# Patient Record
Sex: MALE | Race: WHITE
[De-identification: names, ages, dates, MRNs, and addresses within clinical notes are randomized per-mention and may not be internally consistent; named-entity substitution may affect disease eponyms.]

---

## 2020-07-08 ENCOUNTER — HOSPITAL ENCOUNTER (INPATIENT)
Dept: HOSPITAL 61 - ER | Age: 40
LOS: 3 days | Discharge: HOME | DRG: 552 | End: 2020-07-11
Attending: FAMILY MEDICINE | Admitting: FAMILY MEDICINE
Payer: COMMERCIAL

## 2020-07-08 VITALS — DIASTOLIC BLOOD PRESSURE: 72 MMHG | SYSTOLIC BLOOD PRESSURE: 118 MMHG

## 2020-07-08 VITALS — WEIGHT: 242.51 LBS | BODY MASS INDEX: 32.85 KG/M2 | HEIGHT: 72 IN

## 2020-07-08 VITALS — DIASTOLIC BLOOD PRESSURE: 69 MMHG | SYSTOLIC BLOOD PRESSURE: 113 MMHG

## 2020-07-08 VITALS — SYSTOLIC BLOOD PRESSURE: 101 MMHG | DIASTOLIC BLOOD PRESSURE: 55 MMHG

## 2020-07-08 DIAGNOSIS — F17.210: ICD-10-CM

## 2020-07-08 DIAGNOSIS — M54.16: ICD-10-CM

## 2020-07-08 DIAGNOSIS — Z90.89: ICD-10-CM

## 2020-07-08 DIAGNOSIS — Z79.899: ICD-10-CM

## 2020-07-08 DIAGNOSIS — M51.26: ICD-10-CM

## 2020-07-08 DIAGNOSIS — M48.061: Primary | ICD-10-CM

## 2020-07-08 DIAGNOSIS — Z83.49: ICD-10-CM

## 2020-07-08 DIAGNOSIS — G89.29: ICD-10-CM

## 2020-07-08 DIAGNOSIS — E66.01: ICD-10-CM

## 2020-07-08 LAB
% BANDS: 5 % (ref 0–9)
% LYMPHS: 4 % (ref 24–48)
% MONOS: 1 % (ref 0–10)
% SEGS: 90 % (ref 35–66)
ALBUMIN SERPL-MCNC: 4.1 G/DL (ref 3.4–5)
ALBUMIN/GLOB SERPL: 1.2 {RATIO} (ref 1–1.7)
ALP SERPL-CCNC: 82 U/L (ref 46–116)
ALT SERPL-CCNC: 16 U/L (ref 16–63)
ANION GAP SERPL CALC-SCNC: 9 MMOL/L (ref 6–14)
APTT PPP: YELLOW S
AST SERPL-CCNC: 13 U/L (ref 15–37)
BACTERIA #/AREA URNS HPF: 0 /HPF
BASOPHILS # BLD AUTO: 0 X10^3/UL (ref 0–0.2)
BASOPHILS NFR BLD: 0 % (ref 0–3)
BILIRUB SERPL-MCNC: 0.8 MG/DL (ref 0.2–1)
BILIRUB UR QL STRIP: NEGATIVE
BUN SERPL-MCNC: 11 MG/DL (ref 8–26)
BUN/CREAT SERPL: 11 (ref 6–20)
CALCIUM SERPL-MCNC: 8.8 MG/DL (ref 8.5–10.1)
CHLORIDE SERPL-SCNC: 106 MMOL/L (ref 98–107)
CO2 SERPL-SCNC: 25 MMOL/L (ref 21–32)
CREAT SERPL-MCNC: 1 MG/DL (ref 0.7–1.3)
EOSINOPHIL NFR BLD: 0 % (ref 0–3)
EOSINOPHIL NFR BLD: 0 X10^3/UL (ref 0–0.7)
ERYTHROCYTE [DISTWIDTH] IN BLOOD BY AUTOMATED COUNT: 12.6 % (ref 11.5–14.5)
FIBRINOGEN PPP-MCNC: CLEAR MG/DL
GFR SERPLBLD BASED ON 1.73 SQ M-ARVRAT: 83.2 ML/MIN
GLOBULIN SER-MCNC: 3.3 G/DL (ref 2.2–3.8)
GLUCOSE SERPL-MCNC: 148 MG/DL (ref 70–99)
HCT VFR BLD CALC: 47.6 % (ref 39–53)
HGB BLD-MCNC: 16.6 G/DL (ref 13–17.5)
LYMPHOCYTES # BLD: 0.6 X10^3/UL (ref 1–4.8)
LYMPHOCYTES NFR BLD AUTO: 5 % (ref 24–48)
MCH RBC QN AUTO: 31 PG (ref 25–35)
MCHC RBC AUTO-ENTMCNC: 35 G/DL (ref 31–37)
MCV RBC AUTO: 88 FL (ref 79–100)
MONO #: 0.1 X10^3/UL (ref 0–1.1)
MONOCYTES NFR BLD: 1 % (ref 0–9)
NEUT #: 10.7 X10^3/UL (ref 1.8–7.7)
NEUTROPHILS NFR BLD AUTO: 93 % (ref 31–73)
NITRITE UR QL STRIP: NEGATIVE
PH UR STRIP: 7 [PH]
PLATELET # BLD AUTO: 231 X10^3/UL (ref 140–400)
PLATELET # BLD EST: ADEQUATE 10*3/UL
POTASSIUM SERPL-SCNC: 4.4 MMOL/L (ref 3.5–5.1)
PROT SERPL-MCNC: 7.4 G/DL (ref 6.4–8.2)
PROT UR STRIP-MCNC: NEGATIVE MG/DL
RBC # BLD AUTO: 5.41 X10^6/UL (ref 4.3–5.7)
RBC #/AREA URNS HPF: 0 /HPF (ref 0–2)
SODIUM SERPL-SCNC: 140 MMOL/L (ref 136–145)
UROBILINOGEN UR-MCNC: 1 MG/DL
WBC # BLD AUTO: 11.5 X10^3/UL (ref 4–11)
WBC #/AREA URNS HPF: 0 /HPF (ref 0–4)

## 2020-07-08 PROCEDURE — G0378 HOSPITAL OBSERVATION PER HR: HCPCS

## 2020-07-08 PROCEDURE — 96372 THER/PROPH/DIAG INJ SC/IM: CPT

## 2020-07-08 PROCEDURE — 99285 EMERGENCY DEPT VISIT HI MDM: CPT

## 2020-07-08 PROCEDURE — 72131 CT LUMBAR SPINE W/O DYE: CPT

## 2020-07-08 PROCEDURE — 85025 COMPLETE CBC W/AUTO DIFF WBC: CPT

## 2020-07-08 PROCEDURE — 81001 URINALYSIS AUTO W/SCOPE: CPT

## 2020-07-08 PROCEDURE — G0379 DIRECT REFER HOSPITAL OBSERV: HCPCS

## 2020-07-08 PROCEDURE — 85007 BL SMEAR W/DIFF WBC COUNT: CPT

## 2020-07-08 PROCEDURE — 93971 EXTREMITY STUDY: CPT

## 2020-07-08 PROCEDURE — 36415 COLL VENOUS BLD VENIPUNCTURE: CPT

## 2020-07-08 PROCEDURE — 80053 COMPREHEN METABOLIC PANEL: CPT

## 2020-07-08 PROCEDURE — 96374 THER/PROPH/DIAG INJ IV PUSH: CPT

## 2020-07-08 PROCEDURE — 72148 MRI LUMBAR SPINE W/O DYE: CPT

## 2020-07-08 PROCEDURE — 86140 C-REACTIVE PROTEIN: CPT

## 2020-07-08 RX ADMIN — ENOXAPARIN SODIUM SCH MG: 40 INJECTION SUBCUTANEOUS at 19:29

## 2020-07-08 RX ADMIN — OXYCODONE HYDROCHLORIDE AND ACETAMINOPHEN PRN TAB: 5; 325 TABLET ORAL at 19:53

## 2020-07-08 RX ADMIN — HYDROMORPHONE HYDROCHLORIDE PRN MG: 2 INJECTION INTRAMUSCULAR; INTRAVENOUS; SUBCUTANEOUS at 19:52

## 2020-07-08 RX ADMIN — HYDROMORPHONE HYDROCHLORIDE PRN MG: 2 INJECTION INTRAMUSCULAR; INTRAVENOUS; SUBCUTANEOUS at 14:33

## 2020-07-08 RX ADMIN — BACITRACIN SCH MLS/HR: 5000 INJECTION, POWDER, FOR SOLUTION INTRAMUSCULAR at 19:29

## 2020-07-08 RX ADMIN — HYDROMORPHONE HYDROCHLORIDE PRN MG: 2 INJECTION INTRAMUSCULAR; INTRAVENOUS; SUBCUTANEOUS at 16:44

## 2020-07-08 RX ADMIN — HYDROMORPHONE HYDROCHLORIDE PRN MG: 2 INJECTION INTRAMUSCULAR; INTRAVENOUS; SUBCUTANEOUS at 22:34

## 2020-07-08 NOTE — PDOC1
History and Physical


Date of Admission


Date of Admission


DATE: 7/8/20 


TIME: 14:05





Identification/Chief Complaint


Chief Complaint


 seen in er with intractable pain, unresponsive to outpatient treatment  40  

year old male who presents with severe lower back pain that radiates into his 

legs with numbness in his left posterior leg.  He denies any perianal numbness. 

He does not have any bowel or bladder incontinence or retention.  He denies any 

fever.  Pain started after he lifted his GUN  safe with a friend. on Saturday 

progressively got worse and now he is having a difficult time walking   ct c/w 

severe l2l3 disc herniation





Past Medical History


Past Medical History


obesity


Cardiovascular:  No pertinent hx


Pulmonary:  No pertinent hx


GI:  No pertinent hx


Heme/Onc:  No pertinent hx


Musculoskeletal:   low back pain


Endocrine:  No pertinent hx





Family History


Family History


Past Medical History


Past Medical History:  No Pertinent History


Past Surgical History:  Tonsillectomy


Smoking Status:  Current Every Day Smoker


Alcohol Use:  Occasionally


Family History:  High Cholestrol


Family History:  Parent





Social History


Smoke:  <1 pack per day


ALCOHOL:  occassional


Drugs:  None





Current Problem List


Problem List


Problems


Medical Problems:


(1) Back pain


Status: Acute  





(2) Herniated intervertebral disc of lumbar spine


Status: Acute  











Current Medications


Current Medications





Current Medications


Dexamethasone Sodium Phosphate (Decadron) 10 mg 1X  ONCE IVP ;  Start 7/8/20 at 

10:30;  Stop 7/8/20 at 10:31;  Status DC


Oxycodone/ Acetaminophen (Percocet 5/325) 1 tab 1X  ONCE PO  Last administered 

on 7/8/20at 10:39;  Start 7/8/20 at 10:30;  Stop 7/8/20 at 10:31;  Status DC


Methocarbamol (Robaxin) 750 mg 1X  PRN PO MUSCLE SPASMS Last administered on 

7/8/20at 10:40;  Start 7/8/20 at 10:30;  Stop 7/8/20 at 12:00;  Status DC


Dexamethasone Sodium Phosphate (Decadron) 10 mg 1X  ONCE IM  Last administered 

on 7/8/20at 10:40;  Start 7/8/20 at 11:00;  Stop 7/8/20 at 11:01;  Status DC


Morphine Sulfate (Morphine Sulfate) 5 mg 1X  ONCE IV  Last administered on 

7/8/20at 13:07;  Start 7/8/20 at 12:45;  Stop 7/8/20 at 12:46;  Status DC





Allergies


Allergies:  


Coded Allergies:  


     No Known Drug Allergies (Unverified , 7/8/20)





ROS


Review of System


Review of Systems:


General: Denies fever, chills, sweats, fatigue


Eyes: Denies drainage, blurred vision, eye redness


HENT: Denies rhinorrhea, sore throat, earache


Respiratory: Denies cough, shortness of breath, wheezing


Cardiac: Denies edema, palpitations, chest pain


GI: Denies abdominal pain, Nausea, vomiting


MSK: Reports back pain ., numbness left leg to foot


Skin: Denies rash, jaundice


Neuro: Denies headache, dizziness


General:  No: Chills, Night Sweats, Fatigue, Malaise, Appetite, Other


PSYCHOLOGICAL ROS:  No: Anxiety, Behavioral Disorder, Concentration difficultie,

Decreased libido, Depression, Disorientation, Hallucinations, Hostility, 

Irritablity, Memory difficulties, Mood Swings, Obsessive thoughts, Physical 

abuse, Sexual abuse, Sleep disturbances, Suicidal ideation, Other


Eyes:  No Blurry vision, No Decreased vision, No Double vision, No Dry eyes, No 

Excessive tearing, No Eye Pain, No Itchy Eyes, No Loss of vision, No 

Photophobia, No Scotomata, No Uses contacts, No Uses glasses, No Other


HEENT:  No: Heacaches, Visual Changes, Hearing change, Nasal congestion, Nasal 

discharge, Oral lesions, Sinus pain, Sore Throat, Epistaxis, Sneezing, Snoring, 

Tinnitus, Vertigo, Vocal changes, Other


ALLERGY AND IMMUNOLOGY:  No: Hives, Insect Bite Sensitivity, Itchy/Watery Eyes, 

Nasal Congestion, Post Nasal Drip, Seasonal Allergies, Other


Hematological and Lymphatic:  No: Bleeding Problems, Blood Clots, Blood 

Transfusions, Brusing, Night Sweats, Pallor, Swollen Lymph Nodes, Other


ENDOCRINE:  No: Breast Changes, Galactorrhea, Hair Pattern Changes, Hot Flashes,

Malaise/lethargy, Mood Swings, Palpitations, Polydipsia/polyuria, Skin Changes, 

Temperature Intolerance, Unexpected Weight Changes, Other


Respiratory:  No: Cough, Hemoptysis, Orthopnea, Pleuritic Pain, Shortness of 

breath, SOB with excertion, Sputum Changes, Stridor, Tachypnea, Wheezing, Other


Cardiovascular:  No Chest Pain, No Palpitations, No Orthopnea, No Paroxysmal 

Noc. Dyspnea, No Edema, No Lt Headedness, No Other


Gastrointestinal:  No Nausea, No Vomiting, No Abdominal Pain, No Diarrhea, No 

Constipation, No Melena, No Hematochezia, No Other


Genitourinary:  No Dysuria, No Frequency, No Incontinence, No Hematuria, No 

Retention, No Discharge, No Urgency, No Pain, No Flank Pain, No Other, No , No ,

No , No , No , No , No 


Musculoskeletal:  Yes Gait Disturbance, Yes Pain In: (low back, left leg)


Neurological:  Yes Gait Disturbance; 


   No Behavorial Changes, No Bowel/Bladder ControlChng, No Confusion, No 

Dizziness, No Headaches, No Impaired Coord/balance, No Memory Loss, No Nu

mbness/Tingling, No Seizures, No Speech Problems, No Tremors, No Visual Changes,

No Weakness, No Other


Skin:  No Dry Skin, No Eczema, No Hair Changes, No Lumps, No Mole Changes, No 

Mottling, No Nail Changes, No Pruritus, No Rash, No Skin Lesion Changes, No 

Other, No Acne





Physical Exam


Physical Exam


PE:


General: Awake, alert, mild distress. Well Nourished, well hydrated. Cooperative


HEENT: Atraumatic, EOMI, PERRL, airway patent, moist oral mucosa


Neck: Supple, trachea midline


Respiratory: CTA bilaterally, normal effort, no wheezing/crackles


CV: RRR, no murmur, cap refill <2


GI: Soft, nondistended, nontender, no masses


MSK: No obvious deformities, lower back tenderness, decreased range of motion at

the hips due to significant lower back pain, intact distal range of motion and 

strength


Skin: Warm, dry, intact


Neuro: A&O x3, speech NL, sensory and motor grossly intact, no focal deficits


Psych: Normal affect, normal mood, not suicidal or homicidal


General:  Alert, Oriented X3, Cooperative, mild distress


HEENT:  Atraumatic, PERRLA, EOMI, Mucous membr. moist/pink


Lungs:  Clear to auscultation, Normal air movement


Heart:  S1S2, RRR, no thrills, no gallops, no murmurs


Breasts:  Not examined


Abdomen:  Normal bowel sounds, Soft, No tenderness, No masses


Rectal Exam:  not examined


PELVIC:  Examination not indicated


Extremities:  No cyanosis, No edema, Normal pulses


Skin:  No rashes, No breakdown


Neuro:  Normal speech, Cranial nerves 3-12 NL


Psych/Mental Status:  Mental status NL, Mood NL





Vitals


Vitals





Vital Signs








  Date Time  Temp Pulse Resp B/P (MAP) Pulse Ox O2 Delivery O2 Flow Rate FiO2


 


7/8/20 13:07   16  99 Room Air  


 


7/8/20 09:10 98.2 71  141/78 (99)    





 98.2       











Images


Images





Examination: CT LUMBAR SPINE WO CONTRAST


 


History: Reason: severe pain after lifting / Spl. Instructions:  / 


History: 


 


Comparison/Correlation: None


 


Findings: Axial images through the lumbar spine were obtained without 


contrast. Sagittal and coronal reformatted images were provided. Alignment


is normal. No fracture or bone destruction. Sacralization of L5 noted. 


Bone islands involving the iliac bones at the sacroiliac joint region 


noted. Vertebral body heights are adequate.


 


Mild L1-2 concentric disc bulge is present with slight spinal canal 


stenosis.


 


Central disc herniation at L2-3. Effacement of the thecal sac is notable 


with spinal canal stenosis at this level.


 


Mild L3-4 concentric disc bulge noted.


 


Concentric disc bulge at L4-5 also seen. Effacement of thecal sac noted.


 


There is no nerve root effacement. Neural foramina are widely patent.


 


The visualized retroperitoneum is unremarkable.


 


 


Impression:


Central disc herniation at L2-3 with effacement of the thecal sac with 


marked mass effect on the thecal sac and significant spinal canal 


stenosis.


 


Mild concentric disc bulges at other levels also seen with effacement of 


the thecal sac.


 


 


 


PQRS Compliance Statement:


 


One or more of the following individualized dose reduction techniques were


utilized for this examination:  


1. Automated exposure control  


2. Adjustment of the mA and/or kV according to patient size  


3. Use of iterative reconstruction technique


 


Electronically signed by: Qing De La Paz MD (7/8/2020 10:54 AM) [id number]














DICTATED and SIGNED BY:     QING DE LA PAZ MD


DATE:     07/08/20 1054





VTE Prophylaxis Ordered


VTE Prophylaxis Devices:  Yes


VTE Pharmacological Prophylaxi:  Yes





Assessment/Plan


Assessment/Plan


 


Impression:








Intractable low back pain with lumbar radiculopathy


Central disc herniation at L2-3 with effacement of the thecal sac with marked 

mass effect on the thecal sac and significant spinal canal stenosis.


Mild concentric disc bulges at other levels also seen with effacement of  the 

thecal sac.


morbid obesity


gait instability











plan





admit


iv pain control


dvt prophylaxis


MRI L/S


CONSULT Neurosurgery


bedrest 








D/W ER  MRI PLANNED





Justicifation of Admission Dx:


Justifications for Admission:


Justification of Admission Dx:  Yes











JOHNNY BROWN MD           Jul 8, 2020 14:09

## 2020-07-08 NOTE — NUR
Admit prior to shift change. A/O x 4. Wife at bedside. Admit for back pain. Patient reports 
he was moving a gun safe and injured back causing pain in back and left leg. Orientated to 
call light and room. Reviewed POC to include Lumbar Spine CT, Back MRI and pain management. 
Call for assist out of bed. Verbalized understanding. Resting in bed. Call light at hand.

## 2020-07-08 NOTE — PHYS DOC
Past Medical History


Past Medical History:  No Pertinent History


Past Surgical History:  Tonsillectomy


Smoking Status:  Current Every Day Smoker


Alcohol Use:  Occasionally





General Adult


EDM:


Chief Complaint:  LOWER BACK PAIN OR INJURY





HPI:


HPI:





Patient is a 40  year old male who presents with severe lower back pain that 

radiates into his legs with numbness in his left posterior leg.  He denies any 

perianal numbness.  He does not have any bowel or bladder incontinence or 

retention.  He denies any fever.  Pain started after he lifted is safe with a 

friend.  He states that it progressively got worse and now he is having a 

difficult time walking or moving around.





Review of Systems:


Review of Systems:


General: Denies fever, chills, sweats, fatigue


Eyes: Denies drainage, blurred vision, eye redness


HENT: Denies rhinorrhea, sore throat, earache


Respiratory: Denies cough, shortness of breath, wheezing


Cardiac: Denies edema, palpitations, chest pain


GI: Denies abdominal pain, Nausea, vomiting


MSK: Reports back pain


Skin: Denies rash, jaundice


Neuro: Denies headache, dizziness


Psychiatric: Denies SI/HI





Heart Score:


Risk Factors:


Risk Factors:  DM, Current or recent (<one month) smoker, HTN, HLP, family 

history of CAD, obesity.


Risk Scores:


Score 0 - 3:  2.5% MACE over next 6 weeks - Discharge Home


Score 4 - 6:  20.3% MACE over next 6 weeks - Admit for Clinical Observation


Score 7 - 10:  72.7% MACE over next 6 weeks - Early Invasive Strategies





Current Medications:





Current Medications








 Medications


  (Trade)  Dose


 Ordered  [facility]  Start Time


 Stop Time Status Last Admin


Dose Admin


 


 Dexamethasone


 Sodium Phosphate


  (Decadron)  10 mg  1X  ONCE  7/8/20 10:30


 7/8/20 10:31   





 


 Methocarbamol


  (Robaxin)  750 mg  1X  PRN  7/8/20 10:30


   UNV  





 


 Oxycodone/


 Acetaminophen


  (Percocet 5/325)  1 tab  1X  ONCE  7/8/20 10:30


 7/8/20 10:31   














Allergies:


Allergies:





Allergies








Coded Allergies Type Severity Reaction Last Updated Verified


 


  No Known Drug Allergies    7/8/20 No











Physical Exam:


PE:


General: Awake, alert, NAD. Well Nourished, well hydrated. Cooperative


HEENT: Atraumatic, EOMI, PERRL, airway patent, moist oral mucosa


Neck: Supple, trachea midline


Respiratory: CTA bilaterally, normal effort, no wheezing/crackles


CV: RRR, no murmur, cap refill <2


GI: Soft, nondistended, nontender, no masses


MSK: No obvious deformities, lower back tenderness, decreased range of motion at

the hips due to significant lower back pain, intact distal range of motion and 

strength


Skin: Warm, dry, intact


Neuro: A&O x3, speech NL, sensory and motor grossly intact, no focal deficits


Psych: Normal affect, normal mood, not suicidal or homicidal





Current Patient Data:


Vital Signs:





                                   Vital Signs








  Date Time  Temp Pulse Resp B/P (MAP) Pulse Ox O2 Delivery O2 Flow Rate FiO2


 


7/8/20 09:10 98.2 71 20 141/78 (99) 96 Room Air  





 98.2       











EKG:


EKG:


[]





Radiology/Procedures:


Radiology/Procedures:


[]





Course & Med Decision Making:


Course & Med Decision Making


Pertinent Labs and Imaging studies reviewed. (See chart for details)





Patient is a 40-year-old male who presents to the emergency room complaining of 

back pain with leg numbness and pain that radiates into the back of the legs.  

Patient symptoms are consistent with sciatica versus a herniated disc.  He does 

not have any significant symptoms such as perianal numbness, bowel or bladder 

incontinence or retention, fever, strength difficulty.  He will be given 

symptomatic treatment and a CT scan will be done of the back.  CT shows 

herniated disc.  Despite symptomatic care patient is unable to stand or walk.  

He will be admitted for pain control and evaluation by neurosurgery.





Dragon Disclaimer:


Dragon Disclaimer:


This electronic medical record was generated, in whole or in part, using a voice

 recognition dictation system.





Departure


Departure


Impression:  


   Primary Impression:  


   Back pain


   Additional Impression:  


   Herniated intervertebral disc of lumbar spine


Disposition:  09 ADMITTED AS INPATIENT


Condition:  GOOD


Referrals:  


NO PCP (PCP)





Justicifation of Admission Dx:


Justifications for Admission:


Justification of Admission Dx:  Yes











NIDA FAIRCHILD MD               Jul 8, 2020 10:28

## 2020-07-08 NOTE — RAD
Examination: CT LUMBAR SPINE WO CONTRAST

 

History: Reason: severe pain after lifting / Spl. Instructions:  / 

History: 

 

Comparison/Correlation: None

 

Findings: Axial images through the lumbar spine were obtained without 

contrast. Sagittal and coronal reformatted images were provided. Alignment

is normal. No fracture or bone destruction. Sacralization of L5 noted. 

Bone islands involving the iliac bones at the sacroiliac joint region 

noted. Vertebral body heights are adequate.

 

Mild L1-2 concentric disc bulge is present with slight spinal canal 

stenosis.

 

Central disc herniation at L2-3. Effacement of the thecal sac is notable 

with spinal canal stenosis at this level.

 

Mild L3-4 concentric disc bulge noted.

 

Concentric disc bulge at L4-5 also seen. Effacement of thecal sac noted.

 

There is no nerve root effacement. Neural foramina are widely patent.

 

The visualized retroperitoneum is unremarkable.

 

 

Impression:

Central disc herniation at L2-3 with effacement of the thecal sac with 

marked mass effect on the thecal sac and significant spinal canal 

stenosis.

 

Mild concentric disc bulges at other levels also seen with effacement of 

the thecal sac.

 

 

 

PQRS Compliance Statement:

 

One or more of the following individualized dose reduction techniques were

utilized for this examination:  

1. Automated exposure control  

2. Adjustment of the mA and/or kV according to patient size  

3. Use of iterative reconstruction technique

 

Electronically signed by: Logan Buchanan MD (7/8/2020 10:54 AM) [id number]

## 2020-07-08 NOTE — RAD
MRI Lumbar Spine without contrast

 

History: Lumbar radiculopathy

 

Technique: Multiplanar, multi sequential noncontrast MR imaging was 

performed of the lumbar spine.

 

Comparison: CT lumbar spine the same day

 

Findings: 

There is transitional anatomy. Based on assumption of 12 ribs and 5 lumbar

type vertebral bodies, there is partially formed intervertebral disc space

at what is considered S1-S2 with the most inferior fully formed 

intervertebral disc space considered L5-S1 for this report. Conus 

terminates near L1-2. There is no significant focal marrow edema. Lumbar 

vertebral body stature and AP alignment are maintained. There is mild disc

desiccation at what is considered L5-S1. 

 

L1-L2:  This level was not included on the axial images. Neural foramina 

and spinal canal are adequate.

 

L2-L3:  Neural foramina and spinal canal are adequate. There is minimal 

buckling of the ligamentum flavum and facet hypertrophic change.

 

L3-L4:  There is a large extrusion extending above the intervertebral disc

space greatest centrally, measures about 1.3 cm CC by 0.8 cm AP by about 

1.2 cm transverse. There is prominence of posterior epidural fat 

centrally. There is mild buckling of the ligamentum flavum and facet 

hypertrophic change. Combination of findings results in severe spinal 

stenosis with effacement of subarachnoid space. There is degree of lateral

recess stenosis bilaterally. Left neural foramen is adequate. There is 

mild narrowing of the inferior right neural foramen in part by extrusion 

near the undersurface exiting right L3 nerve root.

 

L4-L5:  There is mild buckling of the ligamentum flavum and facet 

degenerative change, minimal fluid in the facet articulations. Neural 

foramina and spinal canal are adequate.

 

L5-S1:  There is very minimal disc osteophyte complex and bulge. There is 

left posterior annular tear. There is minimal facet degenerative change. 

There is mild narrowing of the far left lateral recess. There is mild 

narrowing of the left neural foramen, right neural foramen adequate.

 

Impression: 

 

1.  There is transitional anatomy of the lumbar spine as stated, most 

inferior fully formed intervertebral disc space considered L5-S1 and 

rudimentary intervertebral disc space at what is considered S1-S2. At what

is considered L3-4 for this exam (described as L2-3 level for CT), there 

is extrusion with resultant severe spinal stenosis and effacement 

subarachnoid space. There is mild narrowing of the right L3-4 neural 

foramen in part by extent of extrusion.

2. There is mild left lateral recess stenosis and mild narrowing of the 

left neural foramen at L5-S1.

 

Electronically signed by: Bryon Souza MD (7/8/2020 6:09 PM) [facility]

## 2020-07-09 VITALS — SYSTOLIC BLOOD PRESSURE: 136 MMHG | DIASTOLIC BLOOD PRESSURE: 78 MMHG

## 2020-07-09 VITALS — DIASTOLIC BLOOD PRESSURE: 73 MMHG | SYSTOLIC BLOOD PRESSURE: 108 MMHG

## 2020-07-09 VITALS — SYSTOLIC BLOOD PRESSURE: 122 MMHG | DIASTOLIC BLOOD PRESSURE: 67 MMHG

## 2020-07-09 VITALS — SYSTOLIC BLOOD PRESSURE: 106 MMHG | DIASTOLIC BLOOD PRESSURE: 66 MMHG

## 2020-07-09 VITALS — SYSTOLIC BLOOD PRESSURE: 106 MMHG | DIASTOLIC BLOOD PRESSURE: 60 MMHG

## 2020-07-09 VITALS — SYSTOLIC BLOOD PRESSURE: 126 MMHG | DIASTOLIC BLOOD PRESSURE: 76 MMHG

## 2020-07-09 RX ADMIN — GABAPENTIN SCH MG: 100 CAPSULE ORAL at 21:11

## 2020-07-09 RX ADMIN — HYDROMORPHONE HYDROCHLORIDE PRN MG: 2 INJECTION INTRAMUSCULAR; INTRAVENOUS; SUBCUTANEOUS at 15:37

## 2020-07-09 RX ADMIN — OXYCODONE HYDROCHLORIDE AND ACETAMINOPHEN PRN TAB: 5; 325 TABLET ORAL at 14:08

## 2020-07-09 RX ADMIN — OXYCODONE HYDROCHLORIDE AND ACETAMINOPHEN PRN TAB: 5; 325 TABLET ORAL at 09:24

## 2020-07-09 RX ADMIN — HYDROMORPHONE HYDROCHLORIDE PRN MG: 2 INJECTION INTRAMUSCULAR; INTRAVENOUS; SUBCUTANEOUS at 14:08

## 2020-07-09 RX ADMIN — BACITRACIN SCH MLS/HR: 5000 INJECTION, POWDER, FOR SOLUTION INTRAMUSCULAR at 15:20

## 2020-07-09 RX ADMIN — HYDROMORPHONE HYDROCHLORIDE PRN MG: 2 INJECTION INTRAMUSCULAR; INTRAVENOUS; SUBCUTANEOUS at 21:23

## 2020-07-09 RX ADMIN — BACITRACIN SCH MLS/HR: 5000 INJECTION, POWDER, FOR SOLUTION INTRAMUSCULAR at 21:23

## 2020-07-09 RX ADMIN — DEXAMETHASONE SODIUM PHOSPHATE SCH MG: 4 INJECTION, SOLUTION INTRAMUSCULAR; INTRAVENOUS at 18:13

## 2020-07-09 RX ADMIN — GABAPENTIN SCH MG: 100 CAPSULE ORAL at 14:08

## 2020-07-09 RX ADMIN — HYDROMORPHONE HYDROCHLORIDE PRN MG: 2 INJECTION INTRAMUSCULAR; INTRAVENOUS; SUBCUTANEOUS at 08:18

## 2020-07-09 RX ADMIN — ENOXAPARIN SODIUM SCH MG: 40 INJECTION SUBCUTANEOUS at 15:40

## 2020-07-09 RX ADMIN — HYDROMORPHONE HYDROCHLORIDE PRN MG: 2 INJECTION INTRAMUSCULAR; INTRAVENOUS; SUBCUTANEOUS at 11:51

## 2020-07-09 RX ADMIN — OXYCODONE HYDROCHLORIDE AND ACETAMINOPHEN PRN TAB: 5; 325 TABLET ORAL at 00:36

## 2020-07-09 RX ADMIN — CYCLOBENZAPRINE HYDROCHLORIDE SCH MG: 10 TABLET, FILM COATED ORAL at 18:13

## 2020-07-09 RX ADMIN — OXYCODONE HYDROCHLORIDE AND ACETAMINOPHEN PRN TAB: 5; 325 TABLET ORAL at 05:06

## 2020-07-09 RX ADMIN — BACITRACIN SCH MLS/HR: 5000 INJECTION, POWDER, FOR SOLUTION INTRAMUSCULAR at 05:04

## 2020-07-09 RX ADMIN — DEXAMETHASONE SODIUM PHOSPHATE SCH MG: 4 INJECTION, SOLUTION INTRAMUSCULAR; INTRAVENOUS at 14:09

## 2020-07-09 RX ADMIN — HYDROMORPHONE HYDROCHLORIDE PRN MG: 2 INJECTION INTRAMUSCULAR; INTRAVENOUS; SUBCUTANEOUS at 05:06

## 2020-07-09 RX ADMIN — GABAPENTIN SCH MG: 100 CAPSULE ORAL at 09:24

## 2020-07-09 RX ADMIN — HYDROMORPHONE HYDROCHLORIDE PRN MG: 2 INJECTION INTRAMUSCULAR; INTRAVENOUS; SUBCUTANEOUS at 02:43

## 2020-07-09 RX ADMIN — OXYCODONE HYDROCHLORIDE AND ACETAMINOPHEN PRN TAB: 5; 325 TABLET ORAL at 18:15

## 2020-07-09 NOTE — PDOC
PROGRESS NOTES


Chief Complaint


Chief Complaint


A/P:


Intractable low back pain with left lumbar radiculopathy


Central disc herniation at L2-3 with effacement of the thecal sac with marked 

mass effect on the thecal sac and significant spinal canal stenosis.


Mild concentric disc bulges at other levels also seen with effacement of  the 

thecal sac.


Obesity


gait instability





History of Present Illness


History of Present Illness


Mr Hays is a 39yo M admitted through ED with intractable pain, unresponsive to 

outpatient treatment  40  year old male who presents with severe lower back pain

that radiates into his legs with numbness in his left posterior leg.  He denies 

any perianal numbness.  He does not have any bowel or bladder incontinence or 

retention.  He denies any fever.  Pain started after he lifted his GUN  safe 

with a friend 5 days ago on Saturday.  Initially had improved and on 7/8/2020 in

the morning he was unable to get up out of bed and now he is having a difficult 

time walking and cannot bear weight on his left leg. Ct c/w severe l2l3 disc 

herniation. MRI shows severe spinal stenosis with disc extrusion L3-4.


He has not been out of bed, his pain has been reasonably controlled no numbness 

or tingling in his right or left leg he is able to move both feet.  He is scared

to lift his left leg.  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  No chest 

pain or shortness of breath.





Vitals


Vitals





Vital Signs








  Date Time  Temp Pulse Resp B/P (MAP) Pulse Ox O2 Delivery O2 Flow Rate FiO2


 


7/9/20 08:18     96 Room Air  


 


7/9/20 07:00 98.4 74 18 108/73 (85)    





 98.4       











Physical Exam


General:  Alert, Oriented X3, Cooperative, mild distress


Abdomen:  Normal bowel sounds, Soft, No tenderness, No masses


Extremities:  No cyanosis, No edema, Normal pulses


Skin:  No rashes, No breakdown





Labs


LABS





Laboratory Tests








Test


 7/8/20


15:05 7/8/20


19:00


 


White Blood Count


 11.5 x10^3/uL


(4.0-11.0) 





 


Red Blood Count


 5.41 x10^6/uL


(4.30-5.70) 





 


Hemoglobin


 16.6 g/dL


(13.0-17.5) 





 


Hematocrit


 47.6 %


(39.0-53.0) 





 


Mean Corpuscular Volume 88 fL ()  


 


Mean Corpuscular Hemoglobin 31 pg (25-35)  


 


Mean Corpuscular Hemoglobin


Concent 35 g/dL


(31-37) 





 


Red Cell Distribution Width


 12.6 %


(11.5-14.5) 





 


Platelet Count


 231 x10^3/uL


(140-400) 





 


Neutrophils (%) (Auto) 93 % (31-73)  


 


Lymphocytes (%) (Auto) 5 % (24-48)  


 


Monocytes (%) (Auto) 1 % (0-9)  


 


Eosinophils (%) (Auto) 0 % (0-3)  


 


Basophils (%) (Auto) 0 % (0-3)  


 


Neutrophils # (Auto)


 10.7 x10^3/uL


(1.8-7.7) 





 


Lymphocytes # (Auto)


 0.6 x10^3/uL


(1.0-4.8) 





 


Monocytes # (Auto)


 0.1 x10^3/uL


(0.0-1.1) 





 


Eosinophils # (Auto)


 0.0 x10^3/uL


(0.0-0.7) 





 


Basophils # (Auto)


 0.0 x10^3/uL


(0.0-0.2) 





 


Segmented Neutrophils % 90 % (35-66)  


 


Band Neutrophils % 5 % (0-9)  


 


Lymphocytes % 4 % (24-48)  


 


Monocytes % 1 % (0-10)  


 


Platelet Estimate


 Adequate


(ADEQUATE) 





 


Sodium Level


 140 mmol/L


(136-145) 





 


Potassium Level


 4.4 mmol/L


(3.5-5.1) 





 


Chloride Level


 106 mmol/L


() 





 


Carbon Dioxide Level


 25 mmol/L


(21-32) 





 


Anion Gap 9 (6-14)  


 


Blood Urea Nitrogen


 11 mg/dL


(8-26) 





 


Creatinine


 1.0 mg/dL


(0.7-1.3) 





 


Estimated GFR


(Cockcroft-Gault) 83.2 


 





 


BUN/Creatinine Ratio 11 (6-20)  


 


Glucose Level


 148 mg/dL


(70-99) 





 


Calcium Level


 8.8 mg/dL


(8.5-10.1) 





 


Total Bilirubin


 0.8 mg/dL


(0.2-1.0) 





 


Aspartate Amino Transf


(AST/SGOT) 13 U/L (15-37) 


 





 


Alanine Aminotransferase


(ALT/SGPT) 16 U/L (16-63) 


 





 


Alkaline Phosphatase


 82 U/L


() 





 


C-Reactive Protein,


Quantitative 1.9 mg/L


(0-3.3) 





 


Total Protein


 7.4 g/dL


(6.4-8.2) 





 


Albumin


 4.1 g/dL


(3.4-5.0) 





 


Albumin/Globulin Ratio 1.2 (1.0-1.7)  


 


Urine Collection Type  Unknown 


 


Urine Color  Yellow 


 


Urine Clarity  Clear 


 


Urine pH  7.0 (<5.0-8.0) 


 


Urine Specific Gravity


 


 >=1.030


(1.000-1.030)


 


Urine Protein


 


 Negative mg/dL


(NEG-TRACE)


 


Urine Glucose (UA)


 


 Negative mg/dL


(NEG)


 


Urine Ketones (Stick)  15 mg/dL (NEG) 


 


Urine Blood  Negative (NEG) 


 


Urine Nitrite  Negative (NEG) 


 


Urine Bilirubin  Negative (NEG) 


 


Urine Urobilinogen Dipstick


 


 1.0 mg/dL (0.2


mg/dL)


 


Urine Leukocyte Esterase  Negative (NEG) 


 


Urine RBC  0 /HPF (0-2) 


 


Urine WBC  0 /HPF (0-4) 


 


Urine Bacteria  0 /HPF (0-FEW) 


 


Urine Mucus  Marked /LPF 











Assessment and Plan


Assessmemt and Plan


Problems


Medical Problems:


(1) Back pain


Status: Acute  





(2) Herniated intervertebral disc of lumbar spine


Status: Acute  











Comment


Review of Relevant


I have reviewed the following items stefanie (where applicable) has been applied.


Labs





Laboratory Tests








Test


 7/8/20


15:05 7/8/20


19:00


 


White Blood Count


 11.5 x10^3/uL


(4.0-11.0) 





 


Red Blood Count


 5.41 x10^6/uL


(4.30-5.70) 





 


Hemoglobin


 16.6 g/dL


(13.0-17.5) 





 


Hematocrit


 47.6 %


(39.0-53.0) 





 


Mean Corpuscular Volume 88 fL ()  


 


Mean Corpuscular Hemoglobin 31 pg (25-35)  


 


Mean Corpuscular Hemoglobin


Concent 35 g/dL


(31-37) 





 


Red Cell Distribution Width


 12.6 %


(11.5-14.5) 





 


Platelet Count


 231 x10^3/uL


(140-400) 





 


Neutrophils (%) (Auto) 93 % (31-73)  


 


Lymphocytes (%) (Auto) 5 % (24-48)  


 


Monocytes (%) (Auto) 1 % (0-9)  


 


Eosinophils (%) (Auto) 0 % (0-3)  


 


Basophils (%) (Auto) 0 % (0-3)  


 


Neutrophils # (Auto)


 10.7 x10^3/uL


(1.8-7.7) 





 


Lymphocytes # (Auto)


 0.6 x10^3/uL


(1.0-4.8) 





 


Monocytes # (Auto)


 0.1 x10^3/uL


(0.0-1.1) 





 


Eosinophils # (Auto)


 0.0 x10^3/uL


(0.0-0.7) 





 


Basophils # (Auto)


 0.0 x10^3/uL


(0.0-0.2) 





 


Segmented Neutrophils % 90 % (35-66)  


 


Band Neutrophils % 5 % (0-9)  


 


Lymphocytes % 4 % (24-48)  


 


Monocytes % 1 % (0-10)  


 


Platelet Estimate


 Adequate


(ADEQUATE) 





 


Sodium Level


 140 mmol/L


(136-145) 





 


Potassium Level


 4.4 mmol/L


(3.5-5.1) 





 


Chloride Level


 106 mmol/L


() 





 


Carbon Dioxide Level


 25 mmol/L


(21-32) 





 


Anion Gap 9 (6-14)  


 


Blood Urea Nitrogen


 11 mg/dL


(8-26) 





 


Creatinine


 1.0 mg/dL


(0.7-1.3) 





 


Estimated GFR


(Cockcroft-Gault) 83.2 


 





 


BUN/Creatinine Ratio 11 (6-20)  


 


Glucose Level


 148 mg/dL


(70-99) 





 


Calcium Level


 8.8 mg/dL


(8.5-10.1) 





 


Total Bilirubin


 0.8 mg/dL


(0.2-1.0) 





 


Aspartate Amino Transf


(AST/SGOT) 13 U/L (15-37) 


 





 


Alanine Aminotransferase


(ALT/SGPT) 16 U/L (16-63) 


 





 


Alkaline Phosphatase


 82 U/L


() 





 


C-Reactive Protein,


Quantitative 1.9 mg/L


(0-3.3) 





 


Total Protein


 7.4 g/dL


(6.4-8.2) 





 


Albumin


 4.1 g/dL


(3.4-5.0) 





 


Albumin/Globulin Ratio 1.2 (1.0-1.7)  


 


Urine Collection Type  Unknown 


 


Urine Color  Yellow 


 


Urine Clarity  Clear 


 


Urine pH  7.0 (<5.0-8.0) 


 


Urine Specific Gravity


 


 >=1.030


(1.000-1.030)


 


Urine Protein


 


 Negative mg/dL


(NEG-TRACE)


 


Urine Glucose (UA)


 


 Negative mg/dL


(NEG)


 


Urine Ketones (Stick)  15 mg/dL (NEG) 


 


Urine Blood  Negative (NEG) 


 


Urine Nitrite  Negative (NEG) 


 


Urine Bilirubin  Negative (NEG) 


 


Urine Urobilinogen Dipstick


 


 1.0 mg/dL (0.2


mg/dL)


 


Urine Leukocyte Esterase  Negative (NEG) 


 


Urine RBC  0 /HPF (0-2) 


 


Urine WBC  0 /HPF (0-4) 


 


Urine Bacteria  0 /HPF (0-FEW) 


 


Urine Mucus  Marked /LPF 








Laboratory Tests








Test


 7/8/20


15:05 7/8/20


19:00


 


White Blood Count


 11.5 x10^3/uL


(4.0-11.0) 





 


Red Blood Count


 5.41 x10^6/uL


(4.30-5.70) 





 


Hemoglobin


 16.6 g/dL


(13.0-17.5) 





 


Hematocrit


 47.6 %


(39.0-53.0) 





 


Mean Corpuscular Volume 88 fL ()  


 


Mean Corpuscular Hemoglobin 31 pg (25-35)  


 


Mean Corpuscular Hemoglobin


Concent 35 g/dL


(31-37) 





 


Red Cell Distribution Width


 12.6 %


(11.5-14.5) 





 


Platelet Count


 231 x10^3/uL


(140-400) 





 


Neutrophils (%) (Auto) 93 % (31-73)  


 


Lymphocytes (%) (Auto) 5 % (24-48)  


 


Monocytes (%) (Auto) 1 % (0-9)  


 


Eosinophils (%) (Auto) 0 % (0-3)  


 


Basophils (%) (Auto) 0 % (0-3)  


 


Neutrophils # (Auto)


 10.7 x10^3/uL


(1.8-7.7) 





 


Lymphocytes # (Auto)


 0.6 x10^3/uL


(1.0-4.8) 





 


Monocytes # (Auto)


 0.1 x10^3/uL


(0.0-1.1) 





 


Eosinophils # (Auto)


 0.0 x10^3/uL


(0.0-0.7) 





 


Basophils # (Auto)


 0.0 x10^3/uL


(0.0-0.2) 





 


Segmented Neutrophils % 90 % (35-66)  


 


Band Neutrophils % 5 % (0-9)  


 


Lymphocytes % 4 % (24-48)  


 


Monocytes % 1 % (0-10)  


 


Platelet Estimate


 Adequate


(ADEQUATE) 





 


Sodium Level


 140 mmol/L


(136-145) 





 


Potassium Level


 4.4 mmol/L


(3.5-5.1) 





 


Chloride Level


 106 mmol/L


() 





 


Carbon Dioxide Level


 25 mmol/L


(21-32) 





 


Anion Gap 9 (6-14)  


 


Blood Urea Nitrogen


 11 mg/dL


(8-26) 





 


Creatinine


 1.0 mg/dL


(0.7-1.3) 





 


Estimated GFR


(Cockcroft-Gault) 83.2 


 





 


BUN/Creatinine Ratio 11 (6-20)  


 


Glucose Level


 148 mg/dL


(70-99) 





 


Calcium Level


 8.8 mg/dL


(8.5-10.1) 





 


Total Bilirubin


 0.8 mg/dL


(0.2-1.0) 





 


Aspartate Amino Transf


(AST/SGOT) 13 U/L (15-37) 


 





 


Alanine Aminotransferase


(ALT/SGPT) 16 U/L (16-63) 


 





 


Alkaline Phosphatase


 82 U/L


() 





 


C-Reactive Protein,


Quantitative 1.9 mg/L


(0-3.3) 





 


Total Protein


 7.4 g/dL


(6.4-8.2) 





 


Albumin


 4.1 g/dL


(3.4-5.0) 





 


Albumin/Globulin Ratio 1.2 (1.0-1.7)  


 


Urine Collection Type  Unknown 


 


Urine Color  Yellow 


 


Urine Clarity  Clear 


 


Urine pH  7.0 (<5.0-8.0) 


 


Urine Specific Gravity


 


 >=1.030


(1.000-1.030)


 


Urine Protein


 


 Negative mg/dL


(NEG-TRACE)


 


Urine Glucose (UA)


 


 Negative mg/dL


(NEG)


 


Urine Ketones (Stick)  15 mg/dL (NEG) 


 


Urine Blood  Negative (NEG) 


 


Urine Nitrite  Negative (NEG) 


 


Urine Bilirubin  Negative (NEG) 


 


Urine Urobilinogen Dipstick


 


 1.0 mg/dL (0.2


mg/dL)


 


Urine Leukocyte Esterase  Negative (NEG) 


 


Urine RBC  0 /HPF (0-2) 


 


Urine WBC  0 /HPF (0-4) 


 


Urine Bacteria  0 /HPF (0-FEW) 


 


Urine Mucus  Marked /LPF 








Medications





Current Medications


Dexamethasone Sodium Phosphate (Decadron) 10 mg 1X  ONCE IVP ;  Start 7/8/20 at 

10:30;  Stop 7/8/20 at 10:31;  Status DC


Oxycodone/ Acetaminophen (Percocet 5/325) 1 tab 1X  ONCE PO  Last administered 

on 7/8/20at 10:39;  Start 7/8/20 at 10:30;  Stop 7/8/20 at 10:31;  Status DC


Methocarbamol (Robaxin) 750 mg 1X  PRN PO MUSCLE SPASMS Last administered on 

7/8/20at 10:40;  Start 7/8/20 at 10:30;  Stop 7/8/20 at 12:00;  Status DC


Dexamethasone Sodium Phosphate (Decadron) 10 mg 1X  ONCE IM  Last administered 

on 7/8/20at 10:40;  Start 7/8/20 at 11:00;  Stop 7/8/20 at 11:01;  Status DC


Morphine Sulfate (Morphine Sulfate) 5 mg 1X  ONCE IV  Last administered on 

7/8/20at 13:07;  Start 7/8/20 at 12:45;  Stop 7/8/20 at 12:46;  Status DC


Sodium Chloride (Normal Saline Flush) 3 ml QSHIFT  PRN IV AFTER MEDS AND BLOOD 

DRAWS;  Start 7/8/20 at 14:30


Sodium Chloride 1,000 ml @  100 mls/hr Q10H IV  Last administered on 7/9/20at 

05:04;  Start 7/8/20 at 14:17


Ondansetron HCl (Zofran) 4 mg PRN Q4HRS  PRN IV NAUSEA/VOMITING;  Start 7/8/20 

at 14:30


Zolpidem Tartrate (Ambien) 5 mg PRN QHS  PRN PO INSOMNIA;  Start 7/8/20 at 14:30


Acetaminophen (Tylenol) 650 mg PRN Q4HRS  PRN PO TEMP OVER 100.4F OR MILD PAIN; 

Start 7/8/20 at 14:30


Al Hydroxide/Mg Hydroxide (Mylanta Plus Xs) 30 ml PRN DAILY  PRN PO HEARTBURN / 

GAS;  Start 7/8/20 at 14:30


Clonidine HCl (Catapres) 0.1 mg PRN Q6HRS  PRN PO SBP>160 OR DBP>90;  Start 

7/8/20 at 14:30


Sodium Monofluorophosphate (Fleet Adult) 133 ml PRN DAILY  PRN CA CONSTIPATION; 

Start 7/8/20 at 14:30


Diphenhydramine HCl (Benadryl) 25 mg PRN Q4HRS  PRN IVP ITCHING;  Start 7/8/20 

at 14:30


Docusate Sodium (Colace) 100 mg PRN BID  PRN PO HARD STOOLS;  Start 7/8/20 at 

14:30


Albuterol/ Ipratropium (Duoneb) 3 ml Q4H NEB ;  Start 7/8/20 at 16:00;  Stop 

7/8/20 at 16:01;  Status DC


Guaifenesin (Robitussin) 200 mg PRN Q4HRS  PRN PO COUGH;  Start 7/8/20 at 14:30


Lorazepam (Ativan) 0.5 mg PRN Q4HRS  PRN PO ANXIETY / AGITATION;  Start 7/8/20 

at 14:30


Lorazepam (Ativan Inj) 2 mg PRN Q4HRS  PRN IV ANXIETY / AGITATION;  Start 7/8/20

at 14:30


Hydromorphone HCl (Dilaudid) 0.7 mg PRN Q2HRS  PRN IV SEVERE PAIN 7-10 Last 

administered on 7/9/20at 08:18;  Start 7/8/20 at 14:30


Enoxaparin Sodium (Lovenox 40mg Syringe) 40 mg Q24H SQ  Last administered on 

7/8/20at 19:29;  Start 7/8/20 at 15:00


Oxycodone/ Acetaminophen (Percocet 5/325) 1 tab PRN Q4HRS  PRN PO MODERATE TO 

SEVERE PAIN Last administered on 7/9/20at 05:06;  Start 7/8/20 at 14:30


Albuterol Sulfate (Ventolin Neb Soln) 2.5 mg PRN Q4HRS  PRN NEB SHORTNESS OF 

BREATH;  Start 7/8/20 at 16:15





Active Scripts


Active


Reported


No Known Medications Prior To Admisstion (Info)  Each 1 Each  DAILY


Vitals/I & O





Vital Sign - Last 24 Hours








 7/8/20 7/8/20 7/8/20 7/8/20





 09:10 10:00 10:39 12:40


 


Temp 98.2   





 98.2   


 


Pulse 71 63  64


 


Resp 20  16 


 


B/P (MAP) 141/78 (99) 142/93 (109)  143/81 (101)


 


Pulse Ox 96 95 99 97


 


O2 Delivery Room Air Room Air Room Air Room Air


 


    





    





 7/8/20 7/8/20 7/8/20 7/8/20





 13:07 14:30 14:33 16:00


 


Pulse  73  57


 


Resp 16  24 


 


B/P (MAP)  164/91 (115)  144/83 (103)


 


Pulse Ox 99 100 97 97


 


O2 Delivery Room Air Room Air Room Air Room Air





 7/8/20 7/8/20 7/8/20 7/8/20





 16:44 17:14 17:55 19:00


 


Temp   98.2 97.7





   98.2 97.7


 


Pulse   78 62


 


Resp 16 18 20 19


 


B/P (MAP)   118/72 (87) 113/69 (84)


 


Pulse Ox 99 94 94 95


 


O2 Delivery Room Air Room Air Room Air Room Air


 


    





    





 7/8/20 7/8/20 7/8/20 7/8/20





 19:52 19:53 20:00 20:22


 


Resp 18 18  18


 


Pulse Ox 94 94  94


 


O2 Delivery Room Air Room Air Room Air Room Air





 7/8/20 7/8/20 7/8/20 7/8/20





 20:53 22:34 23:00 23:04


 


Temp   97.6 





   97.6 


 


Pulse   64 


 


Resp 18 18 18 18


 


B/P (MAP)   101/55 (70) 


 


Pulse Ox 94 94 97 94


 


O2 Delivery Room Air Room Air Room Air Room Air


 


    





    





 7/9/20 7/9/20 7/9/20 7/9/20





 00:36 01:36 02:43 02:47


 


Pulse    68


 


Resp 18 18 18 18


 


B/P (MAP)    106/60 (75)


 


Pulse Ox 94 94 94 96


 


O2 Delivery Room Air Room Air Room Air Room Air





 7/9/20 7/9/20 7/9/20 7/9/20





 03:13 05:06 05:06 05:36


 


Resp 18 18 18 18


 


Pulse Ox 96 96 96 96


 


O2 Delivery Room Air Room Air Room Air Room Air





 7/9/20 7/9/20 7/9/20 





 06:06 07:00 08:18 


 


Temp  98.4  





  98.4  


 


Pulse  74  


 


Resp 18 18  


 


B/P (MAP)  108/73 (85)  


 


Pulse Ox 96 96 96 


 


O2 Delivery Room Air Room Air Room Air 














Intake and Output   


 


 7/8/20 7/8/20 7/9/20





 15:00 23:00 07:00


 


Intake Total  650 ml 1300 ml


 


Output Total  300 ml 


 


Balance  350 ml 1300 ml








Images


MRI lumbar spine:


There is transitional anatomy of the lumbar spine as stated, most inferior fully

formed intervertebral disc space considered L5-S1 and rudimentary intervertebral

disc space at what is considered S1-S2. At what is considered L3-4 for this exam

(described as L2-3 level for CT), there is extrusion with resultant severe 

spinal stenosis and effacement subarachnoid space. There is mild narrowing of 

the right L3-4 neural foramen in part by extent of extrusion.


2. There is mild left lateral recess stenosis and mild narrowing of the left 

neural foramen at L5-S1.





Justicifation of Admission Dx:


Justifications for Admission:


Justification of Admission Dx:  Yes











MAURICIO SKINNER MD         Jul 9, 2020 08:52

## 2020-07-09 NOTE — CONS
DATE OF CONSULTATION:  07/09/2020



PULMONARY CONSULTATION



ATTENDING PHYSICIAN:  Howard Heath MD



REASON FOR CONSULTATION:  The patient was seen at the request of Dr. Heath

for rehab evaluation.



LOCATION:  He is in room 207.



HISTORY OF PRESENT ILLNESS:  This is a 40-year-old [place] man.  The patient

started having severe lower back pain with radiation to his left lower extremity

with associated tingling and numbness in his left leg started after helping a

friend lift gun safe.  The patient also admits chronic lower back pain in the

past, but not like at present.  The patient admits some difficulty to strain to

urinate and to have a bowel movement.



PAST MEDICAL HISTORY:  Includes mild obesity.



FAMILY HISTORY:  The patient had family history of hypercholesterolemia with

family.



SOCIAL HISTORY:  He smokes regularly.



ALLERGIES:  The patient is not known allergic to any medication.



He had radiological studies done, which revealed herniated disk at L3-L4 level

or L2-L3 level with extrusion with resulting severe spinal stenosis and

effacement of the subarachnoid space, mild narrowing of right L3-L4 neural

foramina in part by extent of extrusion, mild left lateral recess stenosis and

mild narrowing of the left neural foramina at L5-S1 with associated minimal disk

osteophyte complex and bulge with associated left posterior annular tear at

L5-S1 level.



PHYSICAL EXAMINATION:  On physical examination today revealed a young male

patient, in moderate distress.  He is alert and oriented to time, place, person

and circumstance and follows commands appropriately.  Moves all 4 extremities

voluntarily where he had 5/5 grade muscle strength and deep tendon reflexes are

1 to 2+ and symmetrical with decreased left knee jerk.  He seemed to have equal

perception of touch and pinprick sensation bilaterally.  He had 5/5 grade muscle

strength in his lower extremities.  The patient is having significant pain while

trying to roll from side to side.  Straight leg raising test is negative

bilaterally.  His skin is intact at this time.



ASSESSMENT:

1.  Young male with chronic lower back pain with recent exacerbation after

lifting a gun safe with his friend in the last 3 days.  The patient with

herniated nucleus pulposus at L2-L3 with associated lumbar spinal stenosis and

neural foraminal compromise and lumbar radiculitis.  No clinical evidence of

ongoing lumbar radiculopathy.

2.  Mild obesity.



RECOMMENDATIONS:  Agree with the plans for IV dexamethasone, to start him on

Flexeril for muscle spasms, to try a lumbar support and try to get him up as

tolerated, to consider lumbar epidural steroid injections or surgery if the pain

persists.



Dr. Heath, I appreciate asking me to participate in the care of this

interesting patient.  I will be glad to see him for followup with you on as

needed basis.

 



______________________________

HILDA GRAJEDA MD



DR:  JANET/carrie  JOB#:  [id number] / [id number]

DD:  07/09/2020 14:11  DT:  07/09/2020 14:28

## 2020-07-09 NOTE — NUR
SS following for discharge planning. SS reviewed pt chart and discussed with pt RN. Pt is 
from home with spouse and is currently on room air. PT/OT ordered. SS will continue to 
follow for discharge planning.

## 2020-07-09 NOTE — PDOC
PROGRESS NOTES


Subjective


Subjective


patient seen and examined


consulted for back pain and herniated lumbar disc


c/o back pain and intermittent left hip and leg pain


problems began Saturday after lifting


bilateral straight leg raising, strength and sensation normal


unable to ambulate due to back pain


MRI with large central disc herniation L3-4


discussed options


agree with steroids


ok to feed


will follow


'





Objective


Objective





Vital Signs








  Date Time  Temp Pulse Resp B/P (MAP) Pulse Ox O2 Delivery O2 Flow Rate FiO2


 


7/9/20 14:08     96 Room Air  


 


7/9/20 11:00 98.3 74 18 106/66 (79)    





 98.3       














Intake and Output 


 


 7/9/20





 07:00


 


Intake Total 1950 ml


 


Output Total 300 ml


 


Balance 1650 ml


 


 


 


Intake Oral 950 ml


 


IV Total 1000 ml


 


Output Urine Total 300 ml











Assessment


Assessment


Problems


Medical Problems:


(1) Back pain


Status: Acute  





(2) Herniated intervertebral disc of lumbar spine


Status: Acute  











Comment


Review of Relevant


I have reviewed the following items stefanie (where applicable) has been applied.


Labs





Laboratory Tests








Test


 7/8/20


15:05 7/8/20


19:00


 


White Blood Count


 11.5 x10^3/uL


(4.0-11.0) 





 


Red Blood Count


 5.41 x10^6/uL


(4.30-5.70) 





 


Hemoglobin


 16.6 g/dL


(13.0-17.5) 





 


Hematocrit


 47.6 %


(39.0-53.0) 





 


Mean Corpuscular Volume 88 fL ()  


 


Mean Corpuscular Hemoglobin 31 pg (25-35)  


 


Mean Corpuscular Hemoglobin


Concent 35 g/dL


(31-37) 





 


Red Cell Distribution Width


 12.6 %


(11.5-14.5) 





 


Platelet Count


 231 x10^3/uL


(140-400) 





 


Neutrophils (%) (Auto) 93 % (31-73)  


 


Lymphocytes (%) (Auto) 5 % (24-48)  


 


Monocytes (%) (Auto) 1 % (0-9)  


 


Eosinophils (%) (Auto) 0 % (0-3)  


 


Basophils (%) (Auto) 0 % (0-3)  


 


Neutrophils # (Auto)


 10.7 x10^3/uL


(1.8-7.7) 





 


Lymphocytes # (Auto)


 0.6 x10^3/uL


(1.0-4.8) 





 


Monocytes # (Auto)


 0.1 x10^3/uL


(0.0-1.1) 





 


Eosinophils # (Auto)


 0.0 x10^3/uL


(0.0-0.7) 





 


Basophils # (Auto)


 0.0 x10^3/uL


(0.0-0.2) 





 


Segmented Neutrophils % 90 % (35-66)  


 


Band Neutrophils % 5 % (0-9)  


 


Lymphocytes % 4 % (24-48)  


 


Monocytes % 1 % (0-10)  


 


Platelet Estimate


 Adequate


(ADEQUATE) 





 


Sodium Level


 140 mmol/L


(136-145) 





 


Potassium Level


 4.4 mmol/L


(3.5-5.1) 





 


Chloride Level


 106 mmol/L


() 





 


Carbon Dioxide Level


 25 mmol/L


(21-32) 





 


Anion Gap 9 (6-14)  


 


Blood Urea Nitrogen


 11 mg/dL


(8-26) 





 


Creatinine


 1.0 mg/dL


(0.7-1.3) 





 


Estimated GFR


(Cockcroft-Gault) 83.2 


 





 


BUN/Creatinine Ratio 11 (6-20)  


 


Glucose Level


 148 mg/dL


(70-99) 





 


Calcium Level


 8.8 mg/dL


(8.5-10.1) 





 


Total Bilirubin


 0.8 mg/dL


(0.2-1.0) 





 


Aspartate Amino Transf


(AST/SGOT) 13 U/L (15-37) 


 





 


Alanine Aminotransferase


(ALT/SGPT) 16 U/L (16-63) 


 





 


Alkaline Phosphatase


 82 U/L


() 





 


C-Reactive Protein,


Quantitative 1.9 mg/L


(0-3.3) 





 


Total Protein


 7.4 g/dL


(6.4-8.2) 





 


Albumin


 4.1 g/dL


(3.4-5.0) 





 


Albumin/Globulin Ratio 1.2 (1.0-1.7)  


 


Urine Collection Type  Unknown 


 


Urine Color  Yellow 


 


Urine Clarity  Clear 


 


Urine pH  7.0 (<5.0-8.0) 


 


Urine Specific Gravity


 


 >=1.030


(1.000-1.030)


 


Urine Protein


 


 Negative mg/dL


(NEG-TRACE)


 


Urine Glucose (UA)


 


 Negative mg/dL


(NEG)


 


Urine Ketones (Stick)  15 mg/dL (NEG) 


 


Urine Blood  Negative (NEG) 


 


Urine Nitrite  Negative (NEG) 


 


Urine Bilirubin  Negative (NEG) 


 


Urine Urobilinogen Dipstick


 


 1.0 mg/dL (0.2


mg/dL)


 


Urine Leukocyte Esterase  Negative (NEG) 


 


Urine RBC  0 /HPF (0-2) 


 


Urine WBC  0 /HPF (0-4) 


 


Urine Bacteria  0 /HPF (0-FEW) 


 


Urine Mucus  Marked /LPF 








Laboratory Tests








Test


 7/8/20


15:05 7/8/20


19:00


 


White Blood Count


 11.5 x10^3/uL


(4.0-11.0) 





 


Red Blood Count


 5.41 x10^6/uL


(4.30-5.70) 





 


Hemoglobin


 16.6 g/dL


(13.0-17.5) 





 


Hematocrit


 47.6 %


(39.0-53.0) 





 


Mean Corpuscular Volume 88 fL ()  


 


Mean Corpuscular Hemoglobin 31 pg (25-35)  


 


Mean Corpuscular Hemoglobin


Concent 35 g/dL


(31-37) 





 


Red Cell Distribution Width


 12.6 %


(11.5-14.5) 





 


Platelet Count


 231 x10^3/uL


(140-400) 





 


Neutrophils (%) (Auto) 93 % (31-73)  


 


Lymphocytes (%) (Auto) 5 % (24-48)  


 


Monocytes (%) (Auto) 1 % (0-9)  


 


Eosinophils (%) (Auto) 0 % (0-3)  


 


Basophils (%) (Auto) 0 % (0-3)  


 


Neutrophils # (Auto)


 10.7 x10^3/uL


(1.8-7.7) 





 


Lymphocytes # (Auto)


 0.6 x10^3/uL


(1.0-4.8) 





 


Monocytes # (Auto)


 0.1 x10^3/uL


(0.0-1.1) 





 


Eosinophils # (Auto)


 0.0 x10^3/uL


(0.0-0.7) 





 


Basophils # (Auto)


 0.0 x10^3/uL


(0.0-0.2) 





 


Segmented Neutrophils % 90 % (35-66)  


 


Band Neutrophils % 5 % (0-9)  


 


Lymphocytes % 4 % (24-48)  


 


Monocytes % 1 % (0-10)  


 


Platelet Estimate


 Adequate


(ADEQUATE) 





 


Sodium Level


 140 mmol/L


(136-145) 





 


Potassium Level


 4.4 mmol/L


(3.5-5.1) 





 


Chloride Level


 106 mmol/L


() 





 


Carbon Dioxide Level


 25 mmol/L


(21-32) 





 


Anion Gap 9 (6-14)  


 


Blood Urea Nitrogen


 11 mg/dL


(8-26) 





 


Creatinine


 1.0 mg/dL


(0.7-1.3) 





 


Estimated GFR


(Cockcroft-Gault) 83.2 


 





 


BUN/Creatinine Ratio 11 (6-20)  


 


Glucose Level


 148 mg/dL


(70-99) 





 


Calcium Level


 8.8 mg/dL


(8.5-10.1) 





 


Total Bilirubin


 0.8 mg/dL


(0.2-1.0) 





 


Aspartate Amino Transf


(AST/SGOT) 13 U/L (15-37) 


 





 


Alanine Aminotransferase


(ALT/SGPT) 16 U/L (16-63) 


 





 


Alkaline Phosphatase


 82 U/L


() 





 


C-Reactive Protein,


Quantitative 1.9 mg/L


(0-3.3) 





 


Total Protein


 7.4 g/dL


(6.4-8.2) 





 


Albumin


 4.1 g/dL


(3.4-5.0) 





 


Albumin/Globulin Ratio 1.2 (1.0-1.7)  


 


Urine Collection Type  Unknown 


 


Urine Color  Yellow 


 


Urine Clarity  Clear 


 


Urine pH  7.0 (<5.0-8.0) 


 


Urine Specific Gravity


 


 >=1.030


(1.000-1.030)


 


Urine Protein


 


 Negative mg/dL


(NEG-TRACE)


 


Urine Glucose (UA)


 


 Negative mg/dL


(NEG)


 


Urine Ketones (Stick)  15 mg/dL (NEG) 


 


Urine Blood  Negative (NEG) 


 


Urine Nitrite  Negative (NEG) 


 


Urine Bilirubin  Negative (NEG) 


 


Urine Urobilinogen Dipstick


 


 1.0 mg/dL (0.2


mg/dL)


 


Urine Leukocyte Esterase  Negative (NEG) 


 


Urine RBC  0 /HPF (0-2) 


 


Urine WBC  0 /HPF (0-4) 


 


Urine Bacteria  0 /HPF (0-FEW) 


 


Urine Mucus  Marked /LPF 








Medications





Current Medications


Dexamethasone Sodium Phosphate (Decadron) 10 mg 1X  ONCE IVP ;  Start 7/8/20 at 

10:30;  Stop 7/8/20 at 10:31;  Status DC


Oxycodone/ Acetaminophen (Percocet 5/325) 1 tab 1X  ONCE PO  Last administered 

on 7/8/20at 10:39;  Start 7/8/20 at 10:30;  Stop 7/8/20 at 10:31;  Status DC


Methocarbamol (Robaxin) 750 mg 1X  PRN PO MUSCLE SPASMS Last administered on 

7/8/20at 10:40;  Start 7/8/20 at 10:30;  Stop 7/8/20 at 12:00;  Status DC


Dexamethasone Sodium Phosphate (Decadron) 10 mg 1X  ONCE IM  Last administered 

on 7/8/20at 10:40;  Start 7/8/20 at 11:00;  Stop 7/8/20 at 11:01;  Status DC


Morphine Sulfate (Morphine Sulfate) 5 mg 1X  ONCE IV  Last administered on 

7/8/20at 13:07;  Start 7/8/20 at 12:45;  Stop 7/8/20 at 12:46;  Status DC


Sodium Chloride (Normal Saline Flush) 3 ml QSHIFT  PRN IV AFTER MEDS AND BLOOD 

DRAWS;  Start 7/8/20 at 14:30


Sodium Chloride 1,000 ml @  100 mls/hr Q10H IV  Last administered on 7/9/20at 

05:04;  Start 7/8/20 at 14:17


Ondansetron HCl (Zofran) 4 mg PRN Q4HRS  PRN IV NAUSEA/VOMITING;  Start 7/8/20 

at 14:30


Zolpidem Tartrate (Ambien) 5 mg PRN QHS  PRN PO INSOMNIA;  Start 7/8/20 at 14:30


Acetaminophen (Tylenol) 650 mg PRN Q4HRS  PRN PO TEMP OVER 100.4F OR MILD PAIN; 

Start 7/8/20 at 14:30


Al Hydroxide/Mg Hydroxide (Mylanta Plus Xs) 30 ml PRN DAILY  PRN PO HEARTBURN / 

GAS;  Start 7/8/20 at 14:30


Clonidine HCl (Catapres) 0.1 mg PRN Q6HRS  PRN PO SBP>160 OR DBP>90;  Start 

7/8/20 at 14:30


Sodium Monofluorophosphate (Fleet Adult) 133 ml PRN DAILY  PRN IA CONSTIPATION; 

Start 7/8/20 at 14:30


Diphenhydramine HCl (Benadryl) 25 mg PRN Q4HRS  PRN IVP ITCHING;  Start 7/8/20 

at 14:30


Docusate Sodium (Colace) 100 mg PRN BID  PRN PO HARD STOOLS;  Start 7/8/20 at 

14:30


Albuterol/ Ipratropium (Duoneb) 3 ml Q4H NEB ;  Start 7/8/20 at 16:00;  Stop 

7/8/20 at 16:01;  Status DC


Guaifenesin (Robitussin) 200 mg PRN Q4HRS  PRN PO COUGH;  Start 7/8/20 at 14:30


Lorazepam (Ativan) 0.5 mg PRN Q4HRS  PRN PO ANXIETY / AGITATION;  Start 7/8/20 

at 14:30


Lorazepam (Ativan Inj) 2 mg PRN Q4HRS  PRN IV ANXIETY / AGITATION;  Start 7/8/20

at 14:30


Hydromorphone HCl (Dilaudid) 0.7 mg PRN Q2HRS  PRN IV SEVERE PAIN 7-10 Last 

administered on 7/9/20at 14:08;  Start 7/8/20 at 14:30


Enoxaparin Sodium (Lovenox 40mg Syringe) 40 mg Q24H SQ  Last administered on 

7/8/20at 19:29;  Start 7/8/20 at 15:00


Oxycodone/ Acetaminophen (Percocet 5/325) 1 tab PRN Q4HRS  PRN PO MODERATE TO 

SEVERE PAIN Last administered on 7/9/20at 14:08;  Start 7/8/20 at 14:30


Albuterol Sulfate (Ventolin Neb Soln) 2.5 mg PRN Q4HRS  PRN NEB SHORTNESS OF 

BREATH;  Start 7/8/20 at 16:15


Gabapentin (Neurontin) 100 mg TID PO  Last administered on 7/9/20at 14:08;  

Start 7/9/20 at 09:00


Prednisone (Prednisone) 20 mg DAILY PO  Last administered on 7/9/20at 09:24;  

Start 7/9/20 at 09:00;  Stop 7/9/20 at 12:35;  Status DC


Dexamethasone Sodium Phosphate (Decadron) 4 mg Q6HRS IVP  Last administered on 

7/9/20at 14:09;  Start 7/9/20 at 12:45


Cyclobenzaprine HCl (Flexeril) 10 mg Q6HRS PO ;  Start 7/9/20 at 18:00





Active Scripts


Active


Reported


No Known Medications Prior To Admisstion (Info)  Each 1 Each  DAILY


Vitals/I & O





Vital Sign - Last 24 Hours








 7/8/20 7/8/20 7/8/20 7/8/20





 16:00 16:44 17:14 17:55


 


Temp    98.2





    98.2


 


Pulse 57   78


 


Resp  16 18 20


 


B/P (MAP) 144/83 (103)   118/72 (87)


 


Pulse Ox 97 99 94 94


 


O2 Delivery Room Air Room Air Room Air Room Air


 


    





    





 7/8/20 7/8/20 7/8/20 7/8/20





 19:00 19:52 19:53 20:00


 


Temp 97.7   





 97.7   


 


Pulse 62   


 


Resp 19 18 18 


 


B/P (MAP) 113/69 (84)   


 


Pulse Ox 95 94 94 


 


O2 Delivery Room Air Room Air Room Air Room Air


 


    





    





 7/8/20 7/8/20 7/8/20 7/8/20





 20:22 20:53 22:34 23:00


 


Temp    97.6





    97.6


 


Pulse    64


 


Resp 18 18 18 18


 


B/P (MAP)    101/55 (70)


 


Pulse Ox 94 94 94 97


 


O2 Delivery Room Air Room Air Room Air Room Air


 


    





    





 7/8/20 7/9/20 7/9/20 7/9/20





 23:04 00:36 01:36 02:43


 


Resp 18 18 18 18


 


Pulse Ox 94 94 94 94


 


O2 Delivery Room Air Room Air Room Air Room Air





 7/9/20 7/9/20 7/9/20 7/9/20





 02:47 03:13 05:06 05:06


 


Pulse 68   


 


Resp 18 18 18 18


 


B/P (MAP) 106/60 (75)   


 


Pulse Ox 96 96 96 96


 


O2 Delivery Room Air Room Air Room Air Room Air





 7/9/20 7/9/20 7/9/20 7/9/20





 05:36 06:06 07:00 08:00


 


Temp   98.4 





   98.4 


 


Pulse   74 


 


Resp 18 18 18 


 


B/P (MAP)   108/73 (85) 


 


Pulse Ox 96 96 96 


 


O2 Delivery Room Air Room Air Room Air Room Air


 


    





    





 7/9/20 7/9/20 7/9/20 7/9/20





 08:18 08:48 09:24 10:24


 


Pulse Ox 96 96 96 96


 


O2 Delivery Room Air Room Air Room Air Room Air





 7/9/20 7/9/20 7/9/20 7/9/20





 11:00 11:51 12:21 14:08


 


Temp 98.3   





 98.3   


 


Pulse 74   


 


Resp 18   


 


B/P (MAP) 106/66 (79)   


 


Pulse Ox 93 96 96 96


 


O2 Delivery Room Air Room Air Room Air Room Air


 


    





    





 7/9/20   





 14:08   


 


Pulse Ox 96   


 


O2 Delivery Room Air   














Intake and Output   


 


 7/8/20 7/8/20 7/9/20





 15:00 23:00 07:00


 


Intake Total  650 ml 1300 ml


 


Output Total  300 ml 


 


Balance  350 ml 1300 ml











Justicifation of Admission Dx:


Justifications for Admission:


Justification of Admission Dx:  Yes











DEL CRESPO MD             Jul 9, 2020 14:53

## 2020-07-10 VITALS — DIASTOLIC BLOOD PRESSURE: 68 MMHG | SYSTOLIC BLOOD PRESSURE: 114 MMHG

## 2020-07-10 VITALS — DIASTOLIC BLOOD PRESSURE: 73 MMHG | SYSTOLIC BLOOD PRESSURE: 120 MMHG

## 2020-07-10 VITALS — DIASTOLIC BLOOD PRESSURE: 71 MMHG | SYSTOLIC BLOOD PRESSURE: 120 MMHG

## 2020-07-10 VITALS — SYSTOLIC BLOOD PRESSURE: 110 MMHG | DIASTOLIC BLOOD PRESSURE: 63 MMHG

## 2020-07-10 VITALS — DIASTOLIC BLOOD PRESSURE: 74 MMHG | SYSTOLIC BLOOD PRESSURE: 113 MMHG

## 2020-07-10 VITALS — SYSTOLIC BLOOD PRESSURE: 134 MMHG | DIASTOLIC BLOOD PRESSURE: 87 MMHG

## 2020-07-10 RX ADMIN — ENOXAPARIN SODIUM SCH MG: 40 INJECTION SUBCUTANEOUS at 16:30

## 2020-07-10 RX ADMIN — OXYCODONE HYDROCHLORIDE AND ACETAMINOPHEN PRN TAB: 5; 325 TABLET ORAL at 04:12

## 2020-07-10 RX ADMIN — OXYCODONE HYDROCHLORIDE AND ACETAMINOPHEN PRN TAB: 5; 325 TABLET ORAL at 21:38

## 2020-07-10 RX ADMIN — HYDROMORPHONE HYDROCHLORIDE PRN MG: 2 INJECTION INTRAMUSCULAR; INTRAVENOUS; SUBCUTANEOUS at 10:07

## 2020-07-10 RX ADMIN — OXYCODONE HYDROCHLORIDE AND ACETAMINOPHEN PRN TAB: 5; 325 TABLET ORAL at 09:18

## 2020-07-10 RX ADMIN — HYDROMORPHONE HYDROCHLORIDE PRN MG: 2 INJECTION INTRAMUSCULAR; INTRAVENOUS; SUBCUTANEOUS at 06:20

## 2020-07-10 RX ADMIN — GABAPENTIN SCH MG: 100 CAPSULE ORAL at 16:29

## 2020-07-10 RX ADMIN — GABAPENTIN SCH MG: 100 CAPSULE ORAL at 21:37

## 2020-07-10 RX ADMIN — DEXAMETHASONE SODIUM PHOSPHATE SCH MG: 4 INJECTION, SOLUTION INTRAMUSCULAR; INTRAVENOUS at 00:09

## 2020-07-10 RX ADMIN — DEXAMETHASONE SODIUM PHOSPHATE SCH MG: 4 INJECTION, SOLUTION INTRAMUSCULAR; INTRAVENOUS at 06:19

## 2020-07-10 RX ADMIN — OXYCODONE HYDROCHLORIDE AND ACETAMINOPHEN PRN TAB: 5; 325 TABLET ORAL at 16:29

## 2020-07-10 RX ADMIN — CYCLOBENZAPRINE HYDROCHLORIDE SCH MG: 10 TABLET, FILM COATED ORAL at 06:19

## 2020-07-10 RX ADMIN — DEXAMETHASONE SODIUM PHOSPHATE SCH MG: 4 INJECTION, SOLUTION INTRAMUSCULAR; INTRAVENOUS at 13:13

## 2020-07-10 RX ADMIN — GABAPENTIN SCH MG: 100 CAPSULE ORAL at 08:21

## 2020-07-10 RX ADMIN — CYCLOBENZAPRINE HYDROCHLORIDE SCH MG: 10 TABLET, FILM COATED ORAL at 17:59

## 2020-07-10 RX ADMIN — OXYCODONE HYDROCHLORIDE AND ACETAMINOPHEN PRN TAB: 5; 325 TABLET ORAL at 00:09

## 2020-07-10 RX ADMIN — CYCLOBENZAPRINE HYDROCHLORIDE SCH MG: 10 TABLET, FILM COATED ORAL at 13:12

## 2020-07-10 RX ADMIN — DEXAMETHASONE SODIUM PHOSPHATE SCH MG: 4 INJECTION, SOLUTION INTRAMUSCULAR; INTRAVENOUS at 17:59

## 2020-07-10 RX ADMIN — CYCLOBENZAPRINE HYDROCHLORIDE SCH MG: 10 TABLET, FILM COATED ORAL at 00:09

## 2020-07-10 NOTE — RAD
EXAM: Left lower extremity venous Doppler sonogram.

 

HISTORY: Pain and swelling.

 

TECHNIQUE: Gray scale and color Doppler sonographic evaluation of the left

lower extremity veins with spectral waveform analysis was performed.

 

FINDINGS: There is normal color flow, normal compressibility and there are

normal spectral waveforms in the common femoral, superficial femoral, 

popliteal, posterior tibial and greater saphenous veins.

 

IMPRESSION: No Doppler evidence of lower extremity deep venous thrombosis.

 

Electronically signed by: Kira Mendoza MD (7/10/2020 4:43 PM) [id number]

## 2020-07-10 NOTE — PDOC
PROGRESS NOTES


Subjective


Subjective


He admits less pain this AM and nursing noted him getting to a sitting position 

in bed.





Objective


Objective





Vital Signs








  Date Time  Temp Pulse Resp B/P (MAP) Pulse Ox O2 Delivery O2 Flow Rate FiO2


 


7/10/20 09:18     100 Room Air  


 


7/10/20 07:00 97.9 52 18 120/71 (87)    





 97.9       














Intake and Output 


 


 7/10/20





 07:00


 


Intake Total 1230 ml


 


Output Total 750 ml


 


Balance 480 ml


 


 


 


Intake Oral 230 ml


 


IV Total 1000 ml


 


Output Urine Total 750 ml


 


# Voids 2











Physical Exam


Physical Exam


He is alert,supine in bed and continues with painfully limited lumbar spine ROM.

No change with his neurological examination.





Assessment


Assessment


Problems


Medical Problems:


(1) Back pain


Status: Acute  





(2) Herniated intervertebral disc of lumbar spine


Status: Acute  











Plan


Plan of Care


To get him up as tolerated and hopefully home when can get up and walk and climb

stairs before he can go home to be followed by pain clinic on out patient basis.





Comment


Review of Relevant


I have reviewed the following items stefanie (where applicable) has been applied.


Labs





Laboratory Tests








Test


 7/8/20


15:05 7/8/20


19:00


 


White Blood Count


 11.5 x10^3/uL


(4.0-11.0) 





 


Red Blood Count


 5.41 x10^6/uL


(4.30-5.70) 





 


Hemoglobin


 16.6 g/dL


(13.0-17.5) 





 


Hematocrit


 47.6 %


(39.0-53.0) 





 


Mean Corpuscular Volume 88 fL ()  


 


Mean Corpuscular Hemoglobin 31 pg (25-35)  


 


Mean Corpuscular Hemoglobin


Concent 35 g/dL


(31-37) 





 


Red Cell Distribution Width


 12.6 %


(11.5-14.5) 





 


Platelet Count


 231 x10^3/uL


(140-400) 





 


Neutrophils (%) (Auto) 93 % (31-73)  


 


Lymphocytes (%) (Auto) 5 % (24-48)  


 


Monocytes (%) (Auto) 1 % (0-9)  


 


Eosinophils (%) (Auto) 0 % (0-3)  


 


Basophils (%) (Auto) 0 % (0-3)  


 


Neutrophils # (Auto)


 10.7 x10^3/uL


(1.8-7.7) 





 


Lymphocytes # (Auto)


 0.6 x10^3/uL


(1.0-4.8) 





 


Monocytes # (Auto)


 0.1 x10^3/uL


(0.0-1.1) 





 


Eosinophils # (Auto)


 0.0 x10^3/uL


(0.0-0.7) 





 


Basophils # (Auto)


 0.0 x10^3/uL


(0.0-0.2) 





 


Segmented Neutrophils % 90 % (35-66)  


 


Band Neutrophils % 5 % (0-9)  


 


Lymphocytes % 4 % (24-48)  


 


Monocytes % 1 % (0-10)  


 


Platelet Estimate


 Adequate


(ADEQUATE) 





 


Sodium Level


 140 mmol/L


(136-145) 





 


Potassium Level


 4.4 mmol/L


(3.5-5.1) 





 


Chloride Level


 106 mmol/L


() 





 


Carbon Dioxide Level


 25 mmol/L


(21-32) 





 


Anion Gap 9 (6-14)  


 


Blood Urea Nitrogen


 11 mg/dL


(8-26) 





 


Creatinine


 1.0 mg/dL


(0.7-1.3) 





 


Estimated GFR


(Cockcroft-Gault) 83.2 


 





 


BUN/Creatinine Ratio 11 (6-20)  


 


Glucose Level


 148 mg/dL


(70-99) 





 


Calcium Level


 8.8 mg/dL


(8.5-10.1) 





 


Total Bilirubin


 0.8 mg/dL


(0.2-1.0) 





 


Aspartate Amino Transf


(AST/SGOT) 13 U/L (15-37) 


 





 


Alanine Aminotransferase


(ALT/SGPT) 16 U/L (16-63) 


 





 


Alkaline Phosphatase


 82 U/L


() 





 


C-Reactive Protein,


Quantitative 1.9 mg/L


(0-3.3) 





 


Total Protein


 7.4 g/dL


(6.4-8.2) 





 


Albumin


 4.1 g/dL


(3.4-5.0) 





 


Albumin/Globulin Ratio 1.2 (1.0-1.7)  


 


Urine Collection Type  Unknown 


 


Urine Color  Yellow 


 


Urine Clarity  Clear 


 


Urine pH  7.0 (<5.0-8.0) 


 


Urine Specific Gravity


 


 >=1.030


(1.000-1.030)


 


Urine Protein


 


 Negative mg/dL


(NEG-TRACE)


 


Urine Glucose (UA)


 


 Negative mg/dL


(NEG)


 


Urine Ketones (Stick)  15 mg/dL (NEG) 


 


Urine Blood  Negative (NEG) 


 


Urine Nitrite  Negative (NEG) 


 


Urine Bilirubin  Negative (NEG) 


 


Urine Urobilinogen Dipstick


 


 1.0 mg/dL (0.2


mg/dL)


 


Urine Leukocyte Esterase  Negative (NEG) 


 


Urine RBC  0 /HPF (0-2) 


 


Urine WBC  0 /HPF (0-4) 


 


Urine Bacteria  0 /HPF (0-FEW) 


 


Urine Mucus  Marked /LPF 








Medications





Current Medications


Dexamethasone Sodium Phosphate (Decadron) 10 mg 1X  ONCE IVP ;  Start 7/8/20 at 

10:30;  Stop 7/8/20 at 10:31;  Status DC


Oxycodone/ Acetaminophen (Percocet 5/325) 1 tab 1X  ONCE PO  Last administered 

on 7/8/20at 10:39;  Start 7/8/20 at 10:30;  Stop 7/8/20 at 10:31;  Status DC


Methocarbamol (Robaxin) 750 mg 1X  PRN PO MUSCLE SPASMS Last administered on 

7/8/20at 10:40;  Start 7/8/20 at 10:30;  Stop 7/8/20 at 12:00;  Status DC


Dexamethasone Sodium Phosphate (Decadron) 10 mg 1X  ONCE IM  Last administered 

on 7/8/20at 10:40;  Start 7/8/20 at 11:00;  Stop 7/8/20 at 11:01;  Status DC


Morphine Sulfate (Morphine Sulfate) 5 mg 1X  ONCE IV  Last administered on 

7/8/20at 13:07;  Start 7/8/20 at 12:45;  Stop 7/8/20 at 12:46;  Status DC


Sodium Chloride (Normal Saline Flush) 3 ml QSHIFT  PRN IV AFTER MEDS AND BLOOD 

DRAWS;  Start 7/8/20 at 14:30


Sodium Chloride 1,000 ml @  100 mls/hr Q10H IV  Last administered on 7/9/20at 

21:23;  Start 7/8/20 at 14:17


Ondansetron HCl (Zofran) 4 mg PRN Q4HRS  PRN IV NAUSEA/VOMITING;  Start 7/8/20 

at 14:30


Zolpidem Tartrate (Ambien) 5 mg PRN QHS  PRN PO INSOMNIA;  Start 7/8/20 at 14:30


Acetaminophen (Tylenol) 650 mg PRN Q4HRS  PRN PO TEMP OVER 100.4F OR MILD PAIN; 

Start 7/8/20 at 14:30


Al Hydroxide/Mg Hydroxide (Mylanta Plus Xs) 30 ml PRN DAILY  PRN PO HEARTBURN / 

GAS;  Start 7/8/20 at 14:30


Clonidine HCl (Catapres) 0.1 mg PRN Q6HRS  PRN PO SBP>160 OR DBP>90;  Start 

7/8/20 at 14:30


Sodium Monofluorophosphate (Fleet Adult) 133 ml PRN DAILY  PRN ID CONSTIPATION; 

Start 7/8/20 at 14:30


Diphenhydramine HCl (Benadryl) 25 mg PRN Q4HRS  PRN IVP ITCHING;  Start 7/8/20 

at 14:30


Docusate Sodium (Colace) 100 mg PRN BID  PRN PO HARD STOOLS;  Start 7/8/20 at 

14:30


Albuterol/ Ipratropium (Duoneb) 3 ml Q4H NEB ;  Start 7/8/20 at 16:00;  Stop 

7/8/20 at 16:01;  Status DC


Guaifenesin (Robitussin) 200 mg PRN Q4HRS  PRN PO COUGH;  Start 7/8/20 at 14:30


Lorazepam (Ativan) 0.5 mg PRN Q4HRS  PRN PO ANXIETY / AGITATION Last 

administered on 7/10/20at 00:09;  Start 7/8/20 at 14:30


Lorazepam (Ativan Inj) 2 mg PRN Q4HRS  PRN IV ANXIETY / AGITATION;  Start 7/8/20

at 14:30


Hydromorphone HCl (Dilaudid) 0.7 mg PRN Q2HRS  PRN IV SEVERE PAIN 7-10 Last 

administered on 7/10/20at 06:20;  Start 7/8/20 at 14:30


Enoxaparin Sodium (Lovenox 40mg Syringe) 40 mg Q24H SQ  Last administered on 

7/9/20at 15:40;  Start 7/8/20 at 15:00


Oxycodone/ Acetaminophen (Percocet 5/325) 1 tab PRN Q4HRS  PRN PO MODERATE TO 

SEVERE PAIN Last administered on 7/10/20at 09:18;  Start 7/8/20 at 14:30


Albuterol Sulfate (Ventolin Neb Soln) 2.5 mg PRN Q4HRS  PRN NEB SHORTNESS OF 

BREATH;  Start 7/8/20 at 16:15


Gabapentin (Neurontin) 100 mg TID PO  Last administered on 7/10/20at 08:21;  

Start 7/9/20 at 09:00


Prednisone (Prednisone) 20 mg DAILY PO  Last administered on 7/9/20at 09:24;  

Start 7/9/20 at 09:00;  Stop 7/9/20 at 12:35;  Status DC


Dexamethasone Sodium Phosphate (Decadron) 4 mg Q6HRS IVP  Last administered on 

7/10/20at 06:19;  Start 7/9/20 at 12:45


Cyclobenzaprine HCl (Flexeril) 10 mg Q6HRS PO  Last administered on 7/10/20at 

06:19;  Start 7/9/20 at 18:00





Active Scripts


Active


Reported


No Known Medications Prior To Admisstion (Info)  Each 1 Each  DAILY


Vitals/I & O





Vital Sign - Last 24 Hours








 7/9/20 7/9/20 7/9/20 7/9/20





 10:24 11:00 11:51 12:21


 


Temp  98.3  





  98.3  


 


Pulse  74  


 


Resp  18  


 


B/P (MAP)  106/66 (79)  


 


Pulse Ox 96 93 96 96


 


O2 Delivery Room Air Room Air Room Air Room Air


 


    





    





 7/9/20 7/9/20 7/9/20 7/9/20





 14:08 14:08 14:38 15:00


 


Temp    98.3





    98.3


 


Pulse    64


 


Resp    18


 


B/P (MAP)    126/76 (93)


 


Pulse Ox 96 96 93 93


 


O2 Delivery Room Air Room Air Room Air Room Air


 


    





    





 7/9/20 7/9/20 7/9/20 7/9/20





 15:08 15:37 16:07 18:15


 


Pulse Ox 93  93 93


 


O2 Delivery Room Air Room Air Room Air Room Air





 7/9/20 7/9/20 7/9/20 7/9/20





 19:15 19:52 20:00 21:23


 


Temp  98.2  





  98.2  


 


Pulse  60  


 


Resp 18 18  18


 


B/P (MAP)  136/78 (97)  


 


Pulse Ox 93 99  99


 


O2 Delivery Room Air Room Air Room Air Room Air


 


    





    





 7/9/20 7/9/20 7/10/20 7/10/20





 21:53 22:39 00:09 01:09


 


Temp  97.9  





  97.9  


 


Pulse  56  


 


Resp 18 18 18 18


 


B/P (MAP)  122/67 (85)  


 


Pulse Ox 99 100 100 100


 


O2 Delivery Room Air Room Air Room Air Room Air


 


    





    





 7/10/20 7/10/20 7/10/20 7/10/20





 03:30 04:12 05:12 06:20


 


Temp 97.9   





 97.9   


 


Pulse 57   


 


Resp 16 18 18 18


 


B/P (MAP) 113/74 (87)   


 


Pulse Ox 98 100 98 98


 


O2 Delivery Room Air Room Air Room Air Room Air


 


    





    





 7/10/20 7/10/20 7/10/20 





 06:50 07:00 09:18 


 


Temp  97.9  





  97.9  


 


Pulse  52  


 


Resp  18  


 


B/P (MAP)  120/71 (87)  


 


Pulse Ox 98 100 100 


 


O2 Delivery Room Air Room Air Room Air 














Intake and Output   


 


 7/9/20 7/9/20 7/10/20





 15:00 23:00 07:00


 


Intake Total 0 ml 1200 ml 30 ml


 


Output Total  350 ml 400 ml


 


Balance 0 ml 850 ml -370 ml











Justicifation of Admission Dx:


Justifications for Admission:


Justification of Admission Dx:  Yes











HILDA GRAJEDA MD            Jul 10, 2020 09:35

## 2020-07-10 NOTE — NUR
SS following up with discharge planning. SS reviewed pt chart and discussed with pt RN. Pt 
is currently on room air. PT recommended home with outpatient. SS will continue to follow 
for discharge planning.
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
## 2020-07-10 NOTE — PDOC
PROGRESS NOTES


Subjective


Subjective


feels better today 


was able to ambulate with walker but contiues with back pain and left leg 

intermittent numbness





Objective


Objective





Vital Signs








  Date Time  Temp Pulse Resp B/P (MAP) Pulse Ox O2 Delivery O2 Flow Rate FiO2


 


7/10/20 11:00 98.1 59 18 114/68 (83) 100 Room Air  





 98.1       














Intake and Output 


 


 7/10/20





 07:00


 


Intake Total 1230 ml


 


Output Total 750 ml


 


Balance 480 ml


 


 


 


Intake Oral 230 ml


 


IV Total 1000 ml


 


Output Urine Total 750 ml


 


# Voids 2











Physical Exam


General:  Alert, Oriented X3, Cooperative


MUSCULOSKELETAL:  Other (MCKEON)


Neuro:  Other (strength 5/5 in LE)





Assessment


Assessment


Problems


Medical Problems:


(1) Back pain


Status: Acute  





(2) Herniated intervertebral disc of lumbar spine


Status: Acute  











Plan


Plan of Care


Continue steroids


will follow


encouraged activity as tolerated


PT





Comment


Review of Relevant


I have reviewed the following items stefanie (where applicable) has been applied.


Labs





Laboratory Tests








Test


 7/8/20


19:00


 


Urine Collection Type Unknown 


 


Urine Color Yellow 


 


Urine Clarity Clear 


 


Urine pH 7.0 (<5.0-8.0) 


 


Urine Specific Gravity


 >=1.030


(1.000-1.030)


 


Urine Protein


 Negative mg/dL


(NEG-TRACE)


 


Urine Glucose (UA)


 Negative mg/dL


(NEG)


 


Urine Ketones (Stick) 15 mg/dL (NEG) 


 


Urine Blood Negative (NEG) 


 


Urine Nitrite Negative (NEG) 


 


Urine Bilirubin Negative (NEG) 


 


Urine Urobilinogen Dipstick


 1.0 mg/dL (0.2


mg/dL)


 


Urine Leukocyte Esterase Negative (NEG) 


 


Urine RBC 0 /HPF (0-2) 


 


Urine WBC 0 /HPF (0-4) 


 


Urine Bacteria 0 /HPF (0-FEW) 


 


Urine Mucus Marked /LPF 








Medications





Current Medications


Dexamethasone Sodium Phosphate (Decadron) 10 mg 1X  ONCE IVP ;  Start 7/8/20 at 

10:30;  Stop 7/8/20 at 10:31;  Status DC


Oxycodone/ Acetaminophen (Percocet 5/325) 1 tab 1X  ONCE PO  Last administered 

on 7/8/20at 10:39;  Start 7/8/20 at 10:30;  Stop 7/8/20 at 10:31;  Status DC


Methocarbamol (Robaxin) 750 mg 1X  PRN PO MUSCLE SPASMS Last administered on 

7/8/20at 10:40;  Start 7/8/20 at 10:30;  Stop 7/8/20 at 12:00;  Status DC


Dexamethasone Sodium Phosphate (Decadron) 10 mg 1X  ONCE IM  Last administered 

on 7/8/20at 10:40;  Start 7/8/20 at 11:00;  Stop 7/8/20 at 11:01;  Status DC


Morphine Sulfate (Morphine Sulfate) 5 mg 1X  ONCE IV  Last administered on 

7/8/20at 13:07;  Start 7/8/20 at 12:45;  Stop 7/8/20 at 12:46;  Status DC


Sodium Chloride (Normal Saline Flush) 3 ml QSHIFT  PRN IV AFTER MEDS AND BLOOD 

DRAWS;  Start 7/8/20 at 14:30


Sodium Chloride 1,000 ml @  100 mls/hr Q10H IV  Last administered on 7/9/20at 

21:23;  Start 7/8/20 at 14:17;  Stop 7/10/20 at 11:39;  Status DC


Ondansetron HCl (Zofran) 4 mg PRN Q4HRS  PRN IV NAUSEA/VOMITING;  Start 7/8/20 

at 14:30


Zolpidem Tartrate (Ambien) 5 mg PRN QHS  PRN PO INSOMNIA;  Start 7/8/20 at 14:30


Acetaminophen (Tylenol) 650 mg PRN Q4HRS  PRN PO TEMP OVER 100.4F OR MILD PAIN; 

Start 7/8/20 at 14:30


Al Hydroxide/Mg Hydroxide (Mylanta Plus Xs) 30 ml PRN DAILY  PRN PO HEARTBURN / 

GAS;  Start 7/8/20 at 14:30


Clonidine HCl (Catapres) 0.1 mg PRN Q6HRS  PRN PO SBP>160 OR DBP>90;  Start 

7/8/20 at 14:30


Sodium Monofluorophosphate (Fleet Adult) 133 ml PRN DAILY  PRN [place] CONSTIPATION; 

Start 7/8/20 at 14:30


Diphenhydramine HCl (Benadryl) 25 mg PRN Q4HRS  PRN IVP ITCHING;  Start 7/8/20 

at 14:30


Docusate Sodium (Colace) 100 mg PRN BID  PRN PO HARD STOOLS;  Start 7/8/20 at 1

4:30


Albuterol/ Ipratropium (Duoneb) 3 ml Q4H NEB ;  Start 7/8/20 at 16:00;  Stop 

7/8/20 at 16:01;  Status DC


Guaifenesin (Robitussin) 200 mg PRN Q4HRS  PRN PO COUGH;  Start 7/8/20 at 14:30


Lorazepam (Ativan) 0.5 mg PRN Q4HRS  PRN PO ANXIETY / AGITATION Last 

administered on 7/10/20at 00:09;  Start 7/8/20 at 14:30


Lorazepam (Ativan Inj) 2 mg PRN Q4HRS  PRN IV ANXIETY / AGITATION;  Start 7/8/20

at 14:30


Hydromorphone HCl (Dilaudid) 0.7 mg PRN Q2HRS  PRN IV SEVERE PAIN 7-10 Last 

administered on 7/10/20at 10:07;  Start 7/8/20 at 14:30


Enoxaparin Sodium (Lovenox 40mg Syringe) 40 mg Q24H SQ  Last administered on 

7/9/20at 15:40;  Start 7/8/20 at 15:00


Oxycodone/ Acetaminophen (Percocet 5/325) 1 tab PRN Q4HRS  PRN PO MODERATE TO 

SEVERE PAIN Last administered on 7/10/20at 09:18;  Start 7/8/20 at 14:30


Albuterol Sulfate (Ventolin Neb Soln) 2.5 mg PRN Q4HRS  PRN NEB SHORTNESS OF 

BREATH;  Start 7/8/20 at 16:15


Gabapentin (Neurontin) 100 mg TID PO  Last administered on 7/10/20at 08:21;  

Start 7/9/20 at 09:00


Prednisone (Prednisone) 20 mg DAILY PO  Last administered on 7/9/20at 09:24;  

Start 7/9/20 at 09:00;  Stop 7/9/20 at 12:35;  Status DC


Dexamethasone Sodium Phosphate (Decadron) 4 mg Q6HRS IVP  Last administered on 

7/10/20at 13:13;  Start 7/9/20 at 12:45


Cyclobenzaprine HCl (Flexeril) 10 mg Q6HRS PO  Last administered on 7/10/20at 

13:12;  Start 7/9/20 at 18:00





Active Scripts


Active


Reported


No Known Medications Prior To Admisstion (Info)  Each 1 Each MC DAILY


Vitals/I & O





Vital Sign - Last 24 Hours








 7/9/20 7/9/20 7/9/20 7/9/20





 18:15 19:15 19:52 20:00


 


Temp   98.2 





   98.2 


 


Pulse   60 


 


Resp  18 18 


 


B/P (MAP)   136/78 (97) 


 


Pulse Ox 93 93 99 


 


O2 Delivery Room Air Room Air Room Air Room Air


 


    





    





 7/9/20 7/9/20 7/9/20 7/10/20





 21:23 21:53 22:39 00:09


 


Temp   97.9 





   97.9 


 


Pulse   56 


 


Resp 18 18 18 18


 


B/P (MAP)   122/67 (85) 


 


Pulse Ox 99 99 100 100


 


O2 Delivery Room Air Room Air Room Air Room Air


 


    





    





 7/10/20 7/10/20 7/10/20 7/10/20





 01:09 03:30 04:12 05:12


 


Temp  97.9  





  97.9  


 


Pulse  57  


 


Resp 18 16 18 18


 


B/P (MAP)  113/74 (87)  


 


Pulse Ox 100 98 100 98


 


O2 Delivery Room Air Room Air Room Air Room Air


 


    





    





 7/10/20 7/10/20 7/10/20 7/10/20





 06:20 06:50 07:00 08:00


 


Temp   97.9 





   97.9 


 


Pulse   52 


 


Resp 18  18 


 


B/P (MAP)   120/71 (87) 


 


Pulse Ox 98 98 100 


 


O2 Delivery Room Air Room Air Room Air Room Air


 


    





    





 7/10/20 7/10/20 7/10/20 7/10/20





 09:18 10:07 10:18 10:37


 


Pulse Ox 100 100 100 100


 


O2 Delivery Room Air Room Air Room Air Room Air





 7/10/20   





 11:00   


 


Temp 98.1   





 98.1   


 


Pulse 59   


 


Resp 18   


 


B/P (MAP) 114/68 (83)   


 


Pulse Ox 100   


 


O2 Delivery Room Air   














Intake and Output   


 


 7/9/20 7/9/20 7/10/20





 15:00 23:00 07:00


 


Intake Total 0 ml 1200 ml 30 ml


 


Output Total  350 ml 400 ml


 


Balance 0 ml 850 ml -370 ml











Justicifation of Admission Dx:


Justifications for Admission:


Justification of Admission Dx:  Yes











SEBAS DODGE APRN          Jul 10, 2020 16:33

## 2020-07-11 VITALS — DIASTOLIC BLOOD PRESSURE: 73 MMHG | SYSTOLIC BLOOD PRESSURE: 121 MMHG

## 2020-07-11 VITALS — DIASTOLIC BLOOD PRESSURE: 82 MMHG | SYSTOLIC BLOOD PRESSURE: 129 MMHG

## 2020-07-11 VITALS — DIASTOLIC BLOOD PRESSURE: 84 MMHG | SYSTOLIC BLOOD PRESSURE: 126 MMHG

## 2020-07-11 VITALS — SYSTOLIC BLOOD PRESSURE: 127 MMHG | DIASTOLIC BLOOD PRESSURE: 79 MMHG

## 2020-07-11 RX ADMIN — OXYCODONE HYDROCHLORIDE AND ACETAMINOPHEN PRN TAB: 5; 325 TABLET ORAL at 02:15

## 2020-07-11 RX ADMIN — CYCLOBENZAPRINE HYDROCHLORIDE SCH MG: 10 TABLET, FILM COATED ORAL at 11:35

## 2020-07-11 RX ADMIN — DEXAMETHASONE SODIUM PHOSPHATE SCH MG: 4 INJECTION, SOLUTION INTRAMUSCULAR; INTRAVENOUS at 00:27

## 2020-07-11 RX ADMIN — OXYCODONE HYDROCHLORIDE AND ACETAMINOPHEN PRN TAB: 5; 325 TABLET ORAL at 08:12

## 2020-07-11 RX ADMIN — OXYCODONE HYDROCHLORIDE AND ACETAMINOPHEN PRN TAB: 5; 325 TABLET ORAL at 15:40

## 2020-07-11 RX ADMIN — CYCLOBENZAPRINE HYDROCHLORIDE SCH MG: 10 TABLET, FILM COATED ORAL at 06:23

## 2020-07-11 RX ADMIN — DEXAMETHASONE SODIUM PHOSPHATE SCH MG: 4 INJECTION, SOLUTION INTRAMUSCULAR; INTRAVENOUS at 06:23

## 2020-07-11 RX ADMIN — DEXAMETHASONE SODIUM PHOSPHATE SCH MG: 4 INJECTION, SOLUTION INTRAMUSCULAR; INTRAVENOUS at 11:35

## 2020-07-11 RX ADMIN — ENOXAPARIN SODIUM SCH MG: 40 INJECTION SUBCUTANEOUS at 14:46

## 2020-07-11 RX ADMIN — CYCLOBENZAPRINE HYDROCHLORIDE SCH MG: 10 TABLET, FILM COATED ORAL at 00:27

## 2020-07-11 RX ADMIN — GABAPENTIN SCH MG: 100 CAPSULE ORAL at 13:40

## 2020-07-11 RX ADMIN — GABAPENTIN SCH MG: 100 CAPSULE ORAL at 08:12

## 2020-07-11 NOTE — PDOC
PROGRESS NOTES


Chief Complaint


Chief Complaint


A/P:


Intractable low back pain with left lumbar radiculopathy


Central disc herniation at L2-3 with effacement of the thecal sac with marked 

mass effect on the thecal sac and significant spinal canal stenosis.


Mild concentric disc bulges at other levels also seen with effacement of  the 

thecal sac.


Obesity


gait instability





History of Present Illness


History of Present Illness


Mr Hays is a 41yo M admitted through ED with intractable pain, unresponsive to 

outpatient treatment  40  year old male who presents with severe lower back pain

that radiates into his legs with numbness in his left posterior leg.  He denies 

any perianal numbness.  He does not have any bowel or bladder incontinence or 

retention.  He denies any fever.  Pain started after he lifted his GUN  safe 

with a friend 5 days ago on Saturday.  Initially had improved and on 7/8/2020 in

the morning he was unable to get up out of bed and now he is having a difficult 

time walking and cannot bear weight on his left leg. Ct c/w severe l2l3 disc 

herniation. MRI shows severe spinal stenosis with disc extrusion L3-4.


He has not been out of bed, his pain has been reasonably controlled no numbness 

or tingling in his right or left leg he is able to move both feet.  He is scared

to lift his left leg.  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  No chest 

pain or shortness of breath.





7/10: Started on IV Decadron after discussion with neurosurgery and PMR on 

7/9/2020.  He is feeling fine laying in bed now, able to sit up in bed but when 

he put his foot on the floor had exquisite pain unable to put weight on his 

foot.  No chest pain or shortness of breath.





Feels better today, was able to ambulate with walker but continues with back 

pain and left leg intermittent numbness. He is able to get up to the bathroom on

his own.  He is requesting to go home and have outpatient referral for physical 

therapy and to follow-up with pain management Dr. Dewey for possible epidural 

steroid injection per the recommendations of physical medicine rehabilitation 

physician, Dr. Longo.





Vitals


Vitals





Vital Signs








  Date Time  Temp Pulse Resp B/P (MAP) Pulse Ox O2 Delivery O2 Flow Rate FiO2


 


7/11/20 10:28 97.9 59 18 121/73 (89) 98 Room Air  





 97.9       











Physical Exam


General:  Alert, Oriented X3, Cooperative


Abdomen:  Normal bowel sounds, Soft, No tenderness, No masses


Extremities:  No cyanosis, No edema, Normal pulses


Skin:  No rashes, No breakdown





Assessment and Plan


Assessmemt and Plan


Problems


Medical Problems:


(1) Back pain


Status: Acute  





(2) Herniated intervertebral disc of lumbar spine


Status: Acute  











Comment


Review of Relevant


I have reviewed the following items stefanie (where applicable) has been applied.


Medications





Current Medications


Dexamethasone Sodium Phosphate (Decadron) 10 mg 1X  ONCE IVP ;  Start 7/8/20 at 

10:30;  Stop 7/8/20 at 10:31;  Status DC


Oxycodone/ Acetaminophen (Percocet 5/325) 1 tab 1X  ONCE PO  Last administered 

on 7/8/20at 10:39;  Start 7/8/20 at 10:30;  Stop 7/8/20 at 10:31;  Status DC


Methocarbamol (Robaxin) 750 mg 1X  PRN PO MUSCLE SPASMS Last administered on 

7/8/20at 10:40;  Start 7/8/20 at 10:30;  Stop 7/8/20 at 12:00;  Status DC


Dexamethasone Sodium Phosphate (Decadron) 10 mg 1X  ONCE IM  Last administered 

on 7/8/20at 10:40;  Start 7/8/20 at 11:00;  Stop 7/8/20 at 11:01;  Status DC


Morphine Sulfate (Morphine Sulfate) 5 mg 1X  ONCE IV  Last administered on 7/8/ 20at 13:07;  Start 7/8/20 at 12:45;  Stop 7/8/20 at 12:46;  Status DC


Sodium Chloride (Normal Saline Flush) 3 ml QSHIFT  PRN IV AFTER MEDS AND BLOOD 

DRAWS;  Start 7/8/20 at 14:30


Sodium Chloride 1,000 ml @  100 mls/hr Q10H IV  Last administered on 7/9/20at 

21:23;  Start 7/8/20 at 14:17;  Stop 7/10/20 at 11:39;  Status DC


Ondansetron HCl (Zofran) 4 mg PRN Q4HRS  PRN IV NAUSEA/VOMITING;  Start 7/8/20 

at 14:30


Zolpidem Tartrate (Ambien) 5 mg PRN QHS  PRN PO INSOMNIA;  Start 7/8/20 at 14:30


Acetaminophen (Tylenol) 650 mg PRN Q4HRS  PRN PO TEMP OVER 100.4F OR MILD PAIN; 

Start 7/8/20 at 14:30


Al Hydroxide/Mg Hydroxide (Mylanta Plus Xs) 30 ml PRN DAILY  PRN PO HEARTBURN / 

GAS;  Start 7/8/20 at 14:30


Clonidine HCl (Catapres) 0.1 mg PRN Q6HRS  PRN PO SBP>160 OR DBP>90;  Start 

7/8/20 at 14:30


Sodium Monofluorophosphate (Fleet Adult) 133 ml PRN DAILY  PRN LA CONSTIPATION; 

Start 7/8/20 at 14:30


Diphenhydramine HCl (Benadryl) 25 mg PRN Q4HRS  PRN IVP ITCHING;  Start 7/8/20 

at 14:30


Docusate Sodium (Colace) 100 mg PRN BID  PRN PO HARD STOOLS;  Start 7/8/20 at 

14:30


Albuterol/ Ipratropium (Duoneb) 3 ml Q4H NEB ;  Start 7/8/20 at 16:00;  Stop 

7/8/20 at 16:01;  Status DC


Guaifenesin (Robitussin) 200 mg PRN Q4HRS  PRN PO COUGH;  Start 7/8/20 at 14:30


Lorazepam (Ativan) 0.5 mg PRN Q4HRS  PRN PO ANXIETY / AGITATION Last 

administered on 7/10/20at 00:09;  Start 7/8/20 at 14:30


Lorazepam (Ativan Inj) 2 mg PRN Q4HRS  PRN IV ANXIETY / AGITATION Last 

administered on 7/11/20at 00:27;  Start 7/8/20 at 14:30


Hydromorphone HCl (Dilaudid) 0.7 mg PRN Q2HRS  PRN IV SEVERE PAIN 7-10 Last 

administered on 7/10/20at 10:07;  Start 7/8/20 at 14:30


Enoxaparin Sodium (Lovenox 40mg Syringe) 40 mg Q24H SQ  Last administered on 

7/10/20at 16:30;  Start 7/8/20 at 15:00


Oxycodone/ Acetaminophen (Percocet 5/325) 1 tab PRN Q4HRS  PRN PO MODERATE TO 

SEVERE PAIN Last administered on 7/11/20at 08:12;  Start 7/8/20 at 14:30


Albuterol Sulfate (Ventolin Neb Soln) 2.5 mg PRN Q4HRS  PRN NEB SHORTNESS OF 

BREATH;  Start 7/8/20 at 16:15


Gabapentin (Neurontin) 100 mg TID PO  Last administered on 7/11/20at 13:40;  

Start 7/9/20 at 09:00


Prednisone (Prednisone) 20 mg DAILY PO  Last administered on 7/9/20at 09:24;  

Start 7/9/20 at 09:00;  Stop 7/9/20 at 12:35;  Status DC


Dexamethasone Sodium Phosphate (Decadron) 4 mg Q6HRS IVP  Last administered on 

7/11/20at 11:35;  Start 7/9/20 at 12:45


Cyclobenzaprine HCl (Flexeril) 10 mg Q6HRS PO  Last administered on 7/11/20at 

11:35;  Start 7/9/20 at 18:00





Active Scripts


Active


Reported


No Known Medications Prior To Admisstion (Info)  Each 1 Each  DAILY


Vitals/I & O





Vital Sign - Last 24 Hours








 7/10/20 7/10/20 7/10/20 7/10/20





 15:00 16:29 17:29 19:00


 


Temp 97.4   98.0





 97.4   98.0


 


Pulse 64   63


 


Resp 18   19


 


B/P (MAP) 134/87 (103)   110/63 (79)


 


Pulse Ox 99   97


 


O2 Delivery Room Air Room Air Room Air Room Air


 


    





    





 7/10/20 7/10/20 7/10/20 7/10/20





 20:00 21:38 22:38 23:00


 


Temp    98.0





    98.0


 


Pulse    64


 


Resp    18


 


B/P (MAP)    120/73 (89)


 


Pulse Ox  99 100 100


 


O2 Delivery Room Air Room Air Room Air Room Air


 


    





    





 7/11/20 7/11/20 7/11/20 7/11/20





 02:15 02:51 03:15 07:21


 


Temp  97.7  97.8





  97.7  97.8


 


Pulse  73  66


 


Resp  18  18


 


B/P (MAP)  126/84 (98)  129/82 (98)


 


Pulse Ox  99 99 99


 


O2 Delivery Room Air Room Air Room Air Room Air


 


    





    





 7/11/20 7/11/20 7/11/20 7/11/20





 08:01 08:02 08:12 09:12


 


Resp   20 20


 


Pulse Ox  98 98 98


 


O2 Delivery Room Air Room Air Room Air Room Air





 7/11/20   





 10:28   


 


Temp 97.9   





 97.9   


 


Pulse 59   


 


Resp 18   


 


B/P (MAP) 121/73 (89)   


 


Pulse Ox 98   


 


O2 Delivery Room Air   














Intake and Output   


 


 7/10/20 7/10/20 7/11/20





 15:00 23:00 07:00


 


Intake Total  1250 ml 


 


Output Total 1200 ml 450 ml 250 ml


 


Balance -1200 ml 800 ml -250 ml











Justicifation of Admission Dx:


Justifications for Admission:


Justification of Admission Dx:  Yes











MAURICIO SKINNER MD        Jul 11, 2020 14:03

## 2020-07-11 NOTE — PDOC3
Discharge Summary


Visit Information


Date of Admission:  Jul 8, 2020


Date of Discharge:  Jul 11, 2020


Admitting Diagnosis:  Herniated lumbar disc, sciatica


Final Diagnosis


Problems


Medical Problems:


(1) Back pain


Status: Acute  





(2) Herniated intervertebral disc of lumbar spine


Status: Acute  











Brief Hospital Course


Allergies





                                    Allergies








Coded Allergies Type Severity Reaction Last Updated Verified


 


  No Known Drug Allergies    7/8/20 No








Vital Signs





Vital Signs








  Date Time  Temp Pulse Resp B/P (MAP) Pulse Ox O2 Delivery O2 Flow Rate FiO2


 


7/11/20 10:28 97.9 59 18 121/73 (89) 98 Room Air  





 97.9       








Brief Hospital Course


Mr Hays is a 39yo M admitted through ED with intractable pain, unresponsive to 

outpatient treatment  40  year old male who presents with severe lower back pain

that radiates into his legs with numbness in his left posterior leg.  He denies 

any perianal numbness.  He does not have any bowel or bladder incontinence or 

retention.  He denies any fever.  Pain started after he lifted his GUN  safe 

with a friend 5 days ago on Saturday.  Initially had improved and on 7/8/2020 in

the morning he was unable to get up out of bed and now he is having a difficult 

time walking and cannot bear weight on his left leg. Ct c/w severe l2l3 disc 

herniation. MRI shows severe spinal stenosis with disc extrusion L3-4.


He has not been out of bed, his pain has been reasonably controlled no numbness 

or tingling in his right or left leg he is able to move both feet.  He is scared

to lift his left leg.  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  No chest 

pain or shortness of breath.





7/10: Started on IV Decadron after discussion with neurosurgery and PMR on 

7/9/2020.  He is feeling fine laying in bed now, able to sit up in bed but when 

he put his foot on the floor had exquisite pain unable to put weight on his 

foot.  No chest pain or shortness of breath.





Feels better today, was able to ambulate with walker but continues with back 

pain and left leg intermittent numbness. He is able to get up to the bathroom on

his own.  He is requesting to go home and have outpatient referral for physical 

therapy and to follow-up with pain management Dr. Dewey for possible epidural 

steroid injection per the recommendations of physical medicine rehabilitation 

physician, Dr. Longo.





Consults: neurosurgery, PMR





Problem list:


Intractable low back pain with left lumbar radiculopathy


Central disc herniation at L2-3 with effacement of the thecal sac with marked 

mass effect on the thecal sac and significant spinal canal stenosis.


Mild concentric disc bulges at other levels also seen with effacement of  the 

thecal sac.


Obesity


gait instability








Greater than 30 minutes spent on d/c home with outpatient PT





Discharge Information


Condition at Discharge:  Improved


Follow Up:  Weeks (1)


Disposition/Orders:  D/C to Home


Scheduled


Gabapentin (Gabapentin **) 100 Mg Capsule, 100 MG PO TID for Sciatica for 10 

Days, #30


   Prescribed by: MAURICIO SKINNER MD on 7/11/20 1416


Info (No Known Medications Prior To Admisstion)  Each, 1 EACH MC DAILY for na, 

(Reported)


   Entered as Reported by: CHAU BANKS on 7/8/20 1918


   Last Action: New Order on 7/8/20 1918 by CHAU BANKS


Methylprednisolone (Medrol) 4 Mg Tab.ds.pk, 1 PKG PO UD for Left Sciatica for 6 

Days, #1


   Prescribed by: MAURICIO SKINNER MD on 7/11/20 1416





Scheduled PRN


Tramadol Hcl (Tramadol Hcl) 50 Mg Tablet, 50 MG PO PRN Q6HRS PRN for PAIN for 6 

Days, #15


   Prescribed by: MAURICIO SKINNER MD on 7/11/20 1421





Justicifation of Admission Dx:


Justifications for Admission:


Justification of Admission Dx:  Yes











MAURICIO SKINNER MD        Jul 11, 2020 14:25

## 2020-07-11 NOTE — PDOC
PROGRESS NOTES


Subjective


Subjective


He feels better and he is constipated and admits that he feels some warmth in 

his left thigh while up for a while.





Objective


Objective





Vital Signs








  Date Time  Temp Pulse Resp B/P (MAP) Pulse Ox O2 Delivery O2 Flow Rate FiO2


 


7/11/20 09:12   20  98 Room Air  


 


7/11/20 07:21 97.8 66  129/82 (98)    





 97.8       














Intake and Output 


 


 7/11/20





 07:00


 


Intake Total 1250 ml


 


Output Total 1900 ml


 


Balance -650 ml


 


 


 


Intake Oral 1250 ml


 


Output Urine Total 1900 ml


 


# Voids 1


 


# Bowel Movements 1











Physical Exam


Physical Exam


He is alert,supine in bed and rolling in bed without any discomfort and he had 

no tenderness to palpation over lumbar area and no muscle spasm and SLR test is 

negative bilaterally but he had decreased sensory perception over left lower 

extremity and absent left ankle jerk. He got up and walked with roller walker 

without any difficulty.





Assessment


Assessment


Problems


Medical Problems:


(1) Back pain


Status: Acute  





(2) Herniated intervertebral disc of lumbar spine


Status: Acute  











Plan


Plan of Care


To let him go home with out patient follow up in pain clinic,neurosurgery and 

myself.





Comment


Review of Relevant


I have reviewed the following items stefanie (where applicable) has been applied.


Medications





Current Medications


Dexamethasone Sodium Phosphate (Decadron) 10 mg 1X  ONCE IVP ;  Start 7/8/20 at 

10:30;  Stop 7/8/20 at 10:31;  Status DC


Oxycodone/ Acetaminophen (Percocet 5/325) 1 tab 1X  ONCE PO  Last administered 

on 7/8/20at 10:39;  Start 7/8/20 at 10:30;  Stop 7/8/20 at 10:31;  Status DC


Methocarbamol (Robaxin) 750 mg 1X  PRN PO MUSCLE SPASMS Last administered on 

7/8/20at 10:40;  Start 7/8/20 at 10:30;  Stop 7/8/20 at 12:00;  Status DC


Dexamethasone Sodium Phosphate (Decadron) 10 mg 1X  ONCE IM  Last administered 

on 7/8/20at 10:40;  Start 7/8/20 at 11:00;  Stop 7/8/20 at 11:01;  Status DC


Morphine Sulfate (Morphine Sulfate) 5 mg 1X  ONCE IV  Last administered on 

7/8/20at 13:07;  Start 7/8/20 at 12:45;  Stop 7/8/20 at 12:46;  Status DC


Sodium Chloride (Normal Saline Flush) 3 ml QSHIFT  PRN IV AFTER MEDS AND BLOOD 

DRAWS;  Start 7/8/20 at 14:30


Sodium Chloride 1,000 ml @  100 mls/hr Q10H IV  Last administered on 7/9/20at 

21:23;  Start 7/8/20 at 14:17;  Stop 7/10/20 at 11:39;  Status DC


Ondansetron HCl (Zofran) 4 mg PRN Q4HRS  PRN IV NAUSEA/VOMITING;  Start 7/8/20 

at 14:30


Zolpidem Tartrate (Ambien) 5 mg PRN QHS  PRN PO INSOMNIA;  Start 7/8/20 at 14:30


Acetaminophen (Tylenol) 650 mg PRN Q4HRS  PRN PO TEMP OVER 100.4F OR MILD PAIN; 

Start 7/8/20 at 14:30


Al Hydroxide/Mg Hydroxide (Mylanta Plus Xs) 30 ml PRN DAILY  PRN PO HEARTBURN / 

GAS;  Start 7/8/20 at 14:30


Clonidine HCl (Catapres) 0.1 mg PRN Q6HRS  PRN PO SBP>160 OR DBP>90;  Start 

7/8/20 at 14:30


Sodium Monofluorophosphate (Fleet Adult) 133 ml PRN DAILY  PRN LA CONSTIPATION; 

Start 7/8/20 at 14:30


Diphenhydramine HCl (Benadryl) 25 mg PRN Q4HRS  PRN IVP ITCHING;  Start 7/8/20 

at 14:30


Docusate Sodium (Colace) 100 mg PRN BID  PRN PO HARD STOOLS;  Start 7/8/20 at 

14:30


Albuterol/ Ipratropium (Duoneb) 3 ml Q4H NEB ;  Start 7/8/20 at 16:00;  Stop 

7/8/20 at 16:01;  Status DC


Guaifenesin (Robitussin) 200 mg PRN Q4HRS  PRN PO COUGH;  Start 7/8/20 at 14:30


Lorazepam (Ativan) 0.5 mg PRN Q4HRS  PRN PO ANXIETY / AGITATION Last 

administered on 7/10/20at 00:09;  Start 7/8/20 at 14:30


Lorazepam (Ativan Inj) 2 mg PRN Q4HRS  PRN IV ANXIETY / AGITATION Last 

administered on 7/11/20at 00:27;  Start 7/8/20 at 14:30


Hydromorphone HCl (Dilaudid) 0.7 mg PRN Q2HRS  PRN IV SEVERE PAIN 7-10 Last 

administered on 7/10/20at 10:07;  Start 7/8/20 at 14:30


Enoxaparin Sodium (Lovenox 40mg Syringe) 40 mg Q24H SQ  Last administered on 

7/10/20at 16:30;  Start 7/8/20 at 15:00


Oxycodone/ Acetaminophen (Percocet 5/325) 1 tab PRN Q4HRS  PRN PO MODERATE TO 

SEVERE PAIN Last administered on 7/11/20at 08:12;  Start 7/8/20 at 14:30


Albuterol Sulfate (Ventolin Neb Soln) 2.5 mg PRN Q4HRS  PRN NEB SHORTNESS OF 

BREATH;  Start 7/8/20 at 16:15


Gabapentin (Neurontin) 100 mg TID PO  Last administered on 7/11/20at 08:12;  

Start 7/9/20 at 09:00


Prednisone (Prednisone) 20 mg DAILY PO  Last administered on 7/9/20at 09:24;  

Start 7/9/20 at 09:00;  Stop 7/9/20 at 12:35;  Status DC


Dexamethasone Sodium Phosphate (Decadron) 4 mg Q6HRS IVP  Last administered on 

7/11/20at 06:23;  Start 7/9/20 at 12:45


Cyclobenzaprine HCl (Flexeril) 10 mg Q6HRS PO  Last administered on 7/11/20at 

06:23;  Start 7/9/20 at 18:00





Active Scripts


Active


Reported


No Known Medications Prior To Admisstion (Info)  Each 1 Each MC DAILY


Vitals/I & O





Vital Sign - Last 24 Hours








 7/10/20 7/10/20 7/10/20 7/10/20





 10:37 11:00 15:00 16:29


 


Temp  98.1 97.4 





  98.1 97.4 


 


Pulse  59 64 


 


Resp  18 18 


 


B/P (MAP)  114/68 (83) 134/87 (103) 


 


Pulse Ox 100 100 99 


 


O2 Delivery Room Air Room Air Room Air Room Air


 


    





    





 7/10/20 7/10/20 7/10/20 7/10/20





 17:29 19:00 20:00 21:38


 


Temp  98.0  





  98.0  


 


Pulse  63  


 


Resp  19  


 


B/P (MAP)  110/63 (79)  


 


Pulse Ox  97  99


 


O2 Delivery Room Air Room Air Room Air Room Air


 


    





    





 7/10/20 7/10/20 7/11/20 7/11/20





 22:38 23:00 02:15 02:51


 


Temp  98.0  97.7





  98.0  97.7


 


Pulse  64  73


 


Resp  18  18


 


B/P (MAP)  120/73 (89)  126/84 (98)


 


Pulse Ox 100 100  99


 


O2 Delivery Room Air Room Air Room Air Room Air


 


    





    





 7/11/20 7/11/20 7/11/20 7/11/20





 03:15 07:21 08:01 08:02


 


Temp  97.8  





  97.8  


 


Pulse  66  


 


Resp  18  


 


B/P (MAP)  129/82 (98)  


 


Pulse Ox 99 99  98


 


O2 Delivery Room Air Room Air Room Air Room Air


 


    





    





 7/11/20 7/11/20  





 08:12 09:12  


 


Resp 20 20  


 


Pulse Ox 98 98  


 


O2 Delivery Room Air Room Air  














Intake and Output   


 


 7/10/20 7/10/20 7/11/20





 15:00 23:00 07:00


 


Intake Total  1250 ml 


 


Output Total 1200 ml 450 ml 250 ml


 


Balance -1200 ml 800 ml -250 ml











Justicifation of Admission Dx:


Justifications for Admission:


Justification of Admission Dx:  Yes











HILDA GRAJEDA MD            Jul 11, 2020 10:37